# Patient Record
Sex: MALE | Race: BLACK OR AFRICAN AMERICAN | NOT HISPANIC OR LATINO | Employment: OTHER | RURAL
[De-identification: names, ages, dates, MRNs, and addresses within clinical notes are randomized per-mention and may not be internally consistent; named-entity substitution may affect disease eponyms.]

---

## 2017-08-16 ENCOUNTER — HISTORICAL (OUTPATIENT)
Dept: ADMINISTRATIVE | Facility: HOSPITAL | Age: 67
End: 2017-08-16

## 2017-08-17 LAB
LAB AP CLINICAL INFORMATION: NORMAL
LAB AP COMMENTS: NORMAL
LAB AP DIAGNOSIS - HISTORICAL: NORMAL
LAB AP GENERAL CAT - HISTORICAL: NEGATIVE
LAB AP PREPARATIONS - HISTORICAL: NORMAL
LAB AP SPECIMEN SUBMITTED - HISTORICAL: NORMAL

## 2019-10-15 ENCOUNTER — HISTORICAL (OUTPATIENT)
Dept: ADMINISTRATIVE | Facility: HOSPITAL | Age: 69
End: 2019-10-15

## 2019-10-17 LAB
LAB AP CLINICAL INFORMATION: NORMAL
LAB AP DIAGNOSIS - HISTORICAL: NORMAL
LAB AP GROSS PATHOLOGY - HISTORICAL: NORMAL
LAB AP SPECIMEN SUBMITTED - HISTORICAL: NORMAL

## 2020-09-06 ENCOUNTER — HISTORICAL (OUTPATIENT)
Dept: ADMINISTRATIVE | Facility: HOSPITAL | Age: 70
End: 2020-09-06

## 2020-09-06 LAB
ABO: NORMAL
ALBUMIN SERPL BCP-MCNC: 3.9 G/DL (ref 3.5–5)
ALBUMIN/GLOB SERPL: 1 {RATIO}
ALP SERPL-CCNC: 90 U/L (ref 45–115)
ALT SERPL W P-5'-P-CCNC: 46 U/L (ref 16–61)
ANION GAP SERPL CALCULATED.3IONS-SCNC: 15 MMOL/L
ANTIBODY SCREEN: NORMAL
APTT PPP: 27.8 SECONDS (ref 25.2–37.3)
AST SERPL W P-5'-P-CCNC: 62 U/L (ref 15–37)
BASOPHILS # BLD AUTO: 0.04 X10E3/UL (ref 0–0.2)
BASOPHILS NFR BLD AUTO: 0.3 % (ref 0–1)
BILIRUB SERPL-MCNC: 0.7 MG/DL (ref 0–1.2)
BUN SERPL-MCNC: 12 MG/DL (ref 7–18)
BUN/CREAT SERPL: 17.9
CALCIUM SERPL-MCNC: 8.9 MG/DL (ref 8.5–10.1)
CHLORIDE SERPL-SCNC: 101 MMOL/L (ref 98–107)
CO2 SERPL-SCNC: 25 MMOL/L (ref 21–32)
CREAT SERPL-MCNC: 0.67 MG/DL (ref 0.7–1.3)
EOSINOPHIL # BLD AUTO: 0.1 X10E3/UL (ref 0–0.5)
EOSINOPHIL NFR BLD AUTO: 0.9 % (ref 1–4)
ERYTHROCYTE [DISTWIDTH] IN BLOOD BY AUTOMATED COUNT: 16.7 % (ref 11.5–14.5)
GLOBULIN SER-MCNC: 4.1 G/DL (ref 2–4)
GLUCOSE SERPL-MCNC: 84 MG/DL (ref 74–106)
HCT VFR BLD AUTO: 51.1 % (ref 40–54)
HGB BLD-MCNC: 16.7 G/DL (ref 13.5–18)
IMM GRANULOCYTES # BLD AUTO: 0.06 X10E3/UL (ref 0–0.04)
IMM GRANULOCYTES NFR BLD: 0.5 % (ref 0–0.4)
INR BLD: 1 (ref 0–3.3)
LYMPHOCYTES # BLD AUTO: 3.77 X10E3/UL (ref 1–4.8)
LYMPHOCYTES NFR BLD AUTO: 33 % (ref 27–41)
MCH RBC QN AUTO: 30.2 PG (ref 27–31)
MCHC RBC AUTO-ENTMCNC: 32.7 G/DL (ref 32–36)
MCV RBC AUTO: 92.4 FL (ref 80–96)
MONOCYTES # BLD AUTO: 0.9 X10E3/UL (ref 0–0.8)
MONOCYTES NFR BLD AUTO: 7.9 % (ref 2–6)
MPC BLD CALC-MCNC: 9.7 FL (ref 9.4–12.4)
NEUTROPHILS # BLD AUTO: 6.57 X10E3/UL (ref 1.8–7.7)
NEUTROPHILS NFR BLD AUTO: 57.4 % (ref 53–65)
NRBC # BLD AUTO: 0 X10E3/UL (ref 0–0)
NRBC, AUTO (.00): 0 /100 (ref 0–0)
PLATELET # BLD AUTO: 241 X10E3/UL (ref 150–400)
POTASSIUM SERPL-SCNC: 3.8 MMOL/L (ref 3.5–5.1)
PROT SERPL-MCNC: 8 G/DL (ref 6.4–8.2)
PROTHROMBIN TIME: 12.7 SECONDS (ref 11.7–14.7)
RBC # BLD AUTO: 5.53 X10E6/UL (ref 4.6–6.2)
RH TYPE: NORMAL
SARS-COV-2 RNA AMPLIFICATION, QUAL: NEGATIVE
SODIUM SERPL-SCNC: 137 MMOL/L (ref 136–145)
WBC # BLD AUTO: 11.44 X10E3/UL (ref 4.5–11)

## 2020-09-07 ENCOUNTER — HISTORICAL (OUTPATIENT)
Dept: ADMINISTRATIVE | Facility: HOSPITAL | Age: 70
End: 2020-09-07

## 2020-09-07 LAB
APTT PPP: 74.4 SECONDS (ref 25.2–37.3)
APTT PPP: 80.7 SECONDS (ref 25.2–37.3)
APTT PPP: 81.9 SECONDS (ref 25.2–37.3)
APTT PPP: 88 SECONDS (ref 25.2–37.3)
INR BLD: 1.04 (ref 0–3.3)
INR BLD: 1.04 (ref 0–3.3)
INR BLD: 1.06 (ref 0–3.3)
PROTHROMBIN TIME: 13.1 SECONDS (ref 11.7–14.7)
PROTHROMBIN TIME: 13.1 SECONDS (ref 11.7–14.7)
PROTHROMBIN TIME: 13.3 SECONDS (ref 11.7–14.7)

## 2020-09-08 ENCOUNTER — HISTORICAL (OUTPATIENT)
Dept: ADMINISTRATIVE | Facility: HOSPITAL | Age: 70
End: 2020-09-08

## 2020-09-08 LAB
APTT PPP: 139.1 SECONDS (ref 25.2–37.3)
APTT PPP: 67.9 SECONDS (ref 25.2–37.3)
HCT VFR BLD AUTO: 46 % (ref 40–54)
HGB BLD-MCNC: 15.4 G/DL (ref 13.5–18)
INR BLD: 1.02 (ref 0–3.3)
INR BLD: 1.06 (ref 0–3.3)
PLATELET # BLD AUTO: 186 X10E3/UL (ref 150–400)
PROTHROMBIN TIME: 12.9 SECONDS (ref 11.7–14.7)
PROTHROMBIN TIME: 13.3 SECONDS (ref 11.7–14.7)
TROPONIN I SERPL-MCNC: <0.017 NG/ML (ref 0–0.06)
TROPONIN I SERPL-MCNC: <0.017 NG/ML (ref 0–0.06)

## 2020-09-09 ENCOUNTER — HISTORICAL (OUTPATIENT)
Dept: ADMINISTRATIVE | Facility: HOSPITAL | Age: 70
End: 2020-09-09

## 2020-09-09 LAB
APTT PPP: 53.5 SECONDS (ref 25.2–37.3)
APTT PPP: 89.6 SECONDS (ref 25.2–37.3)
INR BLD: 0.98 (ref 0–3.3)
INR BLD: 1.02 (ref 0–3.3)
PROTHROMBIN TIME: 12.5 SECONDS (ref 11.7–14.7)
PROTHROMBIN TIME: 12.9 SECONDS (ref 11.7–14.7)
SARS-COV-2 RNA AMPLIFICATION, QUAL: NEGATIVE
TROPONIN I SERPL-MCNC: <0.017 NG/ML (ref 0–0.06)

## 2020-09-10 ENCOUNTER — HISTORICAL (OUTPATIENT)
Dept: ADMINISTRATIVE | Facility: HOSPITAL | Age: 70
End: 2020-09-10

## 2020-09-10 LAB
ABO: NORMAL
ANTIBODY SCREEN: NORMAL
APTT PPP: 61.1 SECONDS (ref 25.2–37.3)
HCT VFR BLD AUTO: 43.1 % (ref 40–54)
HGB BLD-MCNC: 14.4 G/DL (ref 13.5–18)
INR BLD: 1 (ref 0–3.3)
PLATELET # BLD AUTO: 202 X10E3/UL (ref 150–400)
PROTHROMBIN TIME: 12.7 SECONDS (ref 11.7–14.7)
RH TYPE: NORMAL

## 2020-09-11 ENCOUNTER — HISTORICAL (OUTPATIENT)
Dept: ADMINISTRATIVE | Facility: HOSPITAL | Age: 70
End: 2020-09-11

## 2020-09-11 LAB
ANION GAP SERPL CALCULATED.3IONS-SCNC: 11 MMOL/L
BUN SERPL-MCNC: 5 MG/DL (ref 7–18)
CALCIUM SERPL-MCNC: 8.5 MG/DL (ref 8.5–10.1)
CHLORIDE SERPL-SCNC: 102 MMOL/L (ref 98–107)
CO2 SERPL-SCNC: 27 MMOL/L (ref 21–32)
CREAT SERPL-MCNC: 0.74 MG/DL (ref 0.7–1.3)
GLUCOSE SERPL-MCNC: 79 MG/DL (ref 74–106)
HCT VFR BLD AUTO: 44.9 % (ref 40–54)
POTASSIUM SERPL-SCNC: 3.6 MMOL/L (ref 3.5–5.1)
SODIUM SERPL-SCNC: 136 MMOL/L (ref 136–145)

## 2020-12-28 ENCOUNTER — HISTORICAL (OUTPATIENT)
Dept: ADMINISTRATIVE | Facility: HOSPITAL | Age: 70
End: 2020-12-28

## 2020-12-28 LAB
ABO: NORMAL
ALBUMIN SERPL BCP-MCNC: 3.7 G/DL (ref 3.5–5)
ALBUMIN/GLOB SERPL: 0.9 {RATIO}
ALP SERPL-CCNC: 87 U/L (ref 45–115)
ALT SERPL W P-5'-P-CCNC: 64 U/L (ref 16–61)
ANION GAP SERPL CALCULATED.3IONS-SCNC: 14 MMOL/L
ANISOCYTOSIS BLD QL SMEAR: ABNORMAL
ANTIBODY SCREEN: NORMAL
APTT PPP: 20.6 SECONDS (ref 25.2–37.3)
AST SERPL W P-5'-P-CCNC: 168 U/L (ref 15–37)
BASOPHILS # BLD AUTO: 0.05 X10E3/UL (ref 0–0.2)
BASOPHILS NFR BLD AUTO: 0.5 % (ref 0–1)
BILIRUB SERPL-MCNC: 0.8 MG/DL (ref 0–1.2)
BUN SERPL-MCNC: 13 MG/DL (ref 7–18)
BUN/CREAT SERPL: 16
CALCIUM SERPL-MCNC: 9.2 MG/DL (ref 8.5–10.1)
CHLORIDE SERPL-SCNC: 102 MMOL/L (ref 98–107)
CO2 SERPL-SCNC: 29 MMOL/L (ref 21–32)
CREAT SERPL-MCNC: 0.81 MG/DL (ref 0.7–1.3)
EOSINOPHIL # BLD AUTO: 0.06 X10E3/UL (ref 0–0.5)
EOSINOPHIL NFR BLD AUTO: 0.6 % (ref 1–4)
ERYTHROCYTE [DISTWIDTH] IN BLOOD BY AUTOMATED COUNT: 14.8 % (ref 11.5–14.5)
GLOBULIN SER-MCNC: 4.1 G/DL (ref 2–4)
GLUCOSE SERPL-MCNC: 103 MG/DL (ref 74–106)
HCT VFR BLD AUTO: 43.3 % (ref 40–54)
HGB BLD-MCNC: 15.1 G/DL (ref 13.5–18)
IMM GRANULOCYTES # BLD AUTO: 0.04 X10E3/UL (ref 0–0.04)
IMM GRANULOCYTES NFR BLD: 0.4 % (ref 0–0.4)
INR BLD: 0.96 (ref 0–3.3)
LACTATE SERPL-SCNC: 1.7 MMOL/L (ref 0.4–2)
LYMPHOCYTES # BLD AUTO: 2.96 X10E3/UL (ref 1–4.8)
LYMPHOCYTES NFR BLD AUTO: 31.7 % (ref 27–41)
MCH RBC QN AUTO: 32.6 PG (ref 27–31)
MCHC RBC AUTO-ENTMCNC: 34.9 G/DL (ref 32–36)
MCV RBC AUTO: 93.5 FL (ref 80–96)
MONOCYTES # BLD AUTO: 0.97 X10E3/UL (ref 0–0.8)
MONOCYTES NFR BLD AUTO: 10.4 % (ref 2–6)
MPC BLD CALC-MCNC: 9.8 FL (ref 9.4–12.4)
NEUTROPHILS # BLD AUTO: 5.27 X10E3/UL (ref 1.8–7.7)
NEUTROPHILS NFR BLD AUTO: 56.4 % (ref 53–65)
NRBC # BLD AUTO: 0 X10E3/UL (ref 0–0)
NRBC, AUTO (.00): 0.2 /100 (ref 0–0)
PLATELET # BLD AUTO: 227 X10E3/UL (ref 150–400)
PLATELET MORPHOLOGY: ABNORMAL
POLYCHROMASIA BLD QL SMEAR: ABNORMAL
POTASSIUM SERPL-SCNC: 3.7 MMOL/L (ref 3.5–5.1)
PROT SERPL-MCNC: 7.8 G/DL (ref 6.4–8.2)
PROTHROMBIN TIME: 12.3 SECONDS (ref 11.7–14.7)
RBC # BLD AUTO: 4.63 X10E6/UL (ref 4.6–6.2)
RH TYPE: NORMAL
SARS-COV-2 RNA AMPLIFICATION, QUAL: NEGATIVE
SODIUM SERPL-SCNC: 141 MMOL/L (ref 136–145)
TARGETS BLD QL SMEAR: ABNORMAL
WBC # BLD AUTO: 9.35 X10E3/UL (ref 4.5–11)

## 2020-12-29 ENCOUNTER — HISTORICAL (OUTPATIENT)
Dept: ADMINISTRATIVE | Facility: HOSPITAL | Age: 70
End: 2020-12-29

## 2020-12-29 LAB
ANION GAP SERPL CALCULATED.3IONS-SCNC: 14 MMOL/L
BASOPHILS # BLD AUTO: 0.04 X10E3/UL (ref 0–0.2)
BASOPHILS NFR BLD AUTO: 0.2 % (ref 0–1)
BUN SERPL-MCNC: 11 MG/DL (ref 7–18)
CALCIUM SERPL-MCNC: 8 MG/DL (ref 8.5–10.1)
CHLORIDE SERPL-SCNC: 102 MMOL/L (ref 98–107)
CO2 SERPL-SCNC: 25 MMOL/L (ref 21–32)
CREAT SERPL-MCNC: 0.74 MG/DL (ref 0.7–1.3)
EOSINOPHIL # BLD AUTO: 0 X10E3/UL (ref 0–0.5)
EOSINOPHIL NFR BLD AUTO: 0 % (ref 1–4)
ERYTHROCYTE [DISTWIDTH] IN BLOOD BY AUTOMATED COUNT: 15 % (ref 11.5–14.5)
GLUCOSE SERPL-MCNC: 136 MG/DL (ref 70–105)
GLUCOSE SERPL-MCNC: 157 MG/DL (ref 70–105)
GLUCOSE SERPL-MCNC: 202 MG/DL (ref 74–106)
HCT VFR BLD AUTO: 32.2 % (ref 40–54)
HGB BLD-MCNC: 11.1 G/DL (ref 13.5–18)
IMM GRANULOCYTES # BLD AUTO: 0.07 X10E3/UL (ref 0–0.04)
IMM GRANULOCYTES NFR BLD: 0.4 % (ref 0–0.4)
LYMPHOCYTES # BLD AUTO: 1.27 X10E3/UL (ref 1–4.8)
LYMPHOCYTES NFR BLD AUTO: 6.9 % (ref 27–41)
MAGNESIUM SERPL-MCNC: 1.4 MG/DL (ref 1.7–2.3)
MCH RBC QN AUTO: 32.6 PG (ref 27–31)
MCHC RBC AUTO-ENTMCNC: 34.5 G/DL (ref 32–36)
MCV RBC AUTO: 94.4 FL (ref 80–96)
MONOCYTES # BLD AUTO: 1.55 X10E3/UL (ref 0–0.8)
MONOCYTES NFR BLD AUTO: 8.5 % (ref 2–6)
MPC BLD CALC-MCNC: 10.3 FL (ref 9.4–12.4)
NEUTROPHILS # BLD AUTO: 15.39 X10E3/UL (ref 1.8–7.7)
NEUTROPHILS NFR BLD AUTO: 84 % (ref 53–65)
NRBC # BLD AUTO: 0 X10E3/UL (ref 0–0)
NRBC, AUTO (.00): 0.1 /100 (ref 0–0)
PLATELET # BLD AUTO: 200 X10E3/UL (ref 150–400)
POTASSIUM SERPL-SCNC: 3.3 MMOL/L (ref 3.5–5.1)
RBC # BLD AUTO: 3.41 X10E6/UL (ref 4.6–6.2)
SODIUM SERPL-SCNC: 138 MMOL/L (ref 136–145)
WBC # BLD AUTO: 18.32 X10E3/UL (ref 4.5–11)

## 2020-12-30 ENCOUNTER — HISTORICAL (OUTPATIENT)
Dept: ADMINISTRATIVE | Facility: HOSPITAL | Age: 70
End: 2020-12-30

## 2020-12-30 LAB
ANION GAP SERPL CALCULATED.3IONS-SCNC: 9 MMOL/L
BUN SERPL-MCNC: 6 MG/DL (ref 7–18)
CALCIUM SERPL-MCNC: 8 MG/DL (ref 8.5–10.1)
CHLORIDE SERPL-SCNC: 103 MMOL/L (ref 98–107)
CO2 SERPL-SCNC: 28 MMOL/L (ref 21–32)
CREAT SERPL-MCNC: 0.92 MG/DL (ref 0.7–1.3)
GLUCOSE SERPL-MCNC: 112 MG/DL (ref 74–106)
POTASSIUM SERPL-SCNC: 3.9 MMOL/L (ref 3.5–5.1)
SODIUM SERPL-SCNC: 136 MMOL/L (ref 136–145)

## 2020-12-31 ENCOUNTER — HISTORICAL (OUTPATIENT)
Dept: ADMINISTRATIVE | Facility: HOSPITAL | Age: 70
End: 2020-12-31

## 2020-12-31 LAB
ABO: NORMAL
ANION GAP SERPL CALCULATED.3IONS-SCNC: 13 MMOL/L
ANTIBODY SCREEN: NORMAL
BASOPHILS # BLD AUTO: 0.02 X10E3/UL (ref 0–0.2)
BASOPHILS NFR BLD AUTO: 0.2 % (ref 0–1)
BUN SERPL-MCNC: 4 MG/DL (ref 7–18)
CALCIUM SERPL-MCNC: 8.8 MG/DL (ref 8.5–10.1)
CHLORIDE SERPL-SCNC: 104 MMOL/L (ref 98–107)
CO2 SERPL-SCNC: 25 MMOL/L (ref 21–32)
CREAT SERPL-MCNC: 0.67 MG/DL (ref 0.7–1.3)
EOSINOPHIL # BLD AUTO: 0.01 X10E3/UL (ref 0–0.5)
EOSINOPHIL NFR BLD AUTO: 0.1 % (ref 1–4)
ERYTHROCYTE [DISTWIDTH] IN BLOOD BY AUTOMATED COUNT: 14.2 % (ref 11.5–14.5)
GLUCOSE SERPL-MCNC: 118 MG/DL (ref 74–106)
HCO3 UR-SCNC: 25 MMOL/L (ref 21–28)
HCT VFR BLD AUTO: 26.5 % (ref 40–54)
HGB BLD-MCNC: 9 G/DL (ref 13.5–18)
IMM GRANULOCYTES # BLD AUTO: 0.08 X10E3/UL (ref 0–0.04)
IMM GRANULOCYTES NFR BLD: 0.6 % (ref 0–0.4)
LYMPHOCYTES # BLD AUTO: 1.94 X10E3/UL (ref 1–4.8)
LYMPHOCYTES NFR BLD AUTO: 15.7 % (ref 27–41)
MCH RBC QN AUTO: 32.1 PG (ref 27–31)
MCHC RBC AUTO-ENTMCNC: 34 G/DL (ref 32–36)
MCV RBC AUTO: 94.6 FL (ref 80–96)
MONOCYTES # BLD AUTO: 1.28 X10E3/UL (ref 0–0.8)
MONOCYTES NFR BLD AUTO: 10.4 % (ref 2–6)
MPC BLD CALC-MCNC: 10.5 FL (ref 9.4–12.4)
NEUTROPHILS # BLD AUTO: 9.03 X10E3/UL (ref 1.8–7.7)
NEUTROPHILS NFR BLD AUTO: 73 % (ref 53–65)
NRBC # BLD AUTO: 0 X10E3/UL (ref 0–0)
NRBC, AUTO (.00): 0 /100 (ref 0–0)
PCO2 BLDA: 29 MM HG (ref 35–48)
PH SMN: 7.54 PH UNITS (ref 7.35–7.45)
PLATELET # BLD AUTO: 173 X10E3/UL (ref 150–400)
PO2 BLDA: 101 MM HG (ref 83–108)
POC A-ADO2: 12
POC BASE EXCESS ARTERIAL: 2.9 MMOL/L (ref -2–3)
POC SATURATED O2: 98 % (ref 95–98)
POTASSIUM SERPL-SCNC: 3.8 MMOL/L (ref 3.5–5.1)
RBC # BLD AUTO: 2.8 X10E6/UL (ref 4.6–6.2)
RH TYPE: NORMAL
SODIUM SERPL-SCNC: 138 MMOL/L (ref 136–145)
WBC # BLD AUTO: 12.36 X10E3/UL (ref 4.5–11)

## 2021-01-04 ENCOUNTER — HISTORICAL (OUTPATIENT)
Dept: ADMINISTRATIVE | Facility: HOSPITAL | Age: 71
End: 2021-01-04

## 2021-01-04 LAB
ABO: NORMAL
ANION GAP SERPL CALCULATED.3IONS-SCNC: 11 MMOL/L
ANTIBODY SCREEN: NORMAL
BASOPHILS # BLD AUTO: 0.04 X10E3/UL (ref 0–0.2)
BASOPHILS NFR BLD AUTO: 0.3 % (ref 0–1)
BUN SERPL-MCNC: 5 MG/DL (ref 7–18)
CALCIUM SERPL-MCNC: 8.9 MG/DL (ref 8.5–10.1)
CHLORIDE SERPL-SCNC: 103 MMOL/L (ref 98–107)
CO2 SERPL-SCNC: 23 MMOL/L (ref 21–32)
CREAT SERPL-MCNC: 0.81 MG/DL (ref 0.7–1.3)
EOSINOPHIL # BLD AUTO: 0.06 X10E3/UL (ref 0–0.5)
EOSINOPHIL NFR BLD AUTO: 0.4 % (ref 1–4)
ERYTHROCYTE [DISTWIDTH] IN BLOOD BY AUTOMATED COUNT: 14.4 % (ref 11.5–14.5)
GLUCOSE SERPL-MCNC: 101 MG/DL (ref 74–106)
HCT VFR BLD AUTO: 26.3 % (ref 40–54)
HGB BLD-MCNC: 8.9 G/DL (ref 13.5–18)
IMM GRANULOCYTES # BLD AUTO: 0.15 X10E3/UL (ref 0–0.04)
IMM GRANULOCYTES NFR BLD: 1 % (ref 0–0.4)
LYMPHOCYTES # BLD AUTO: 2.35 X10E3/UL (ref 1–4.8)
LYMPHOCYTES NFR BLD AUTO: 16.3 % (ref 27–41)
MCH RBC QN AUTO: 32.4 PG (ref 27–31)
MCHC RBC AUTO-ENTMCNC: 33.8 G/DL (ref 32–36)
MCV RBC AUTO: 95.6 FL (ref 80–96)
MONOCYTES # BLD AUTO: 1.83 X10E3/UL (ref 0–0.8)
MONOCYTES NFR BLD AUTO: 12.7 % (ref 2–6)
MPC BLD CALC-MCNC: 9.7 FL (ref 9.4–12.4)
NEUTROPHILS # BLD AUTO: 9.96 X10E3/UL (ref 1.8–7.7)
NEUTROPHILS NFR BLD AUTO: 69.3 % (ref 53–65)
NRBC # BLD AUTO: 0 X10E3/UL (ref 0–0)
NRBC, AUTO (.00): 0 /100 (ref 0–0)
PLATELET # BLD AUTO: 324 X10E3/UL (ref 150–400)
POTASSIUM SERPL-SCNC: 4 MMOL/L (ref 3.5–5.1)
RBC # BLD AUTO: 2.75 X10E6/UL (ref 4.6–6.2)
RH TYPE: NORMAL
SODIUM SERPL-SCNC: 133 MMOL/L (ref 136–145)
WBC # BLD AUTO: 14.39 X10E3/UL (ref 4.5–11)

## 2021-01-05 ENCOUNTER — HISTORICAL (OUTPATIENT)
Dept: ADMINISTRATIVE | Facility: HOSPITAL | Age: 71
End: 2021-01-05

## 2021-01-05 LAB
HCT VFR BLD AUTO: 22.2 % (ref 40–54)
HCT VFR BLD AUTO: 24.6 % (ref 40–54)

## 2021-01-06 LAB

## 2021-03-22 RX ORDER — METOPROLOL TARTRATE 25 MG/1
25 TABLET, FILM COATED ORAL 2 TIMES DAILY
COMMUNITY

## 2021-03-22 RX ORDER — ASPIRIN 325 MG
325 TABLET ORAL DAILY
COMMUNITY

## 2021-03-24 ENCOUNTER — HOSPITAL ENCOUNTER (OUTPATIENT)
Dept: RADIOLOGY | Facility: HOSPITAL | Age: 71
Discharge: HOME | End: 2021-03-24
Attending: SURGERY
Payer: MEDICARE

## 2021-03-24 ENCOUNTER — OFFICE VISIT (OUTPATIENT)
Dept: SURGERY | Facility: CLINIC | Age: 71
End: 2021-03-24
Payer: MEDICARE

## 2021-03-24 VITALS
TEMPERATURE: 98 F | HEIGHT: 68 IN | BODY MASS INDEX: 21.22 KG/M2 | OXYGEN SATURATION: 97 % | HEART RATE: 67 BPM | WEIGHT: 140 LBS

## 2021-03-24 DIAGNOSIS — I73.9 PVD (PERIPHERAL VASCULAR DISEASE): Primary | ICD-10-CM

## 2021-03-24 DIAGNOSIS — I73.9 PERIPHERAL VASCULAR DISEASE, UNSPECIFIED: ICD-10-CM

## 2021-03-24 PROCEDURE — 99212 PR OFFICE/OUTPT VISIT, EST, LEVL II, 10-19 MIN: ICD-10-PCS | Mod: S$PBB,,, | Performed by: SURGERY

## 2021-03-24 PROCEDURE — 93923 US ARTERIAL DOPPLER EXAM: ICD-10-PCS | Mod: 26,,, | Performed by: SURGERY

## 2021-03-24 PROCEDURE — 99213 OFFICE O/P EST LOW 20 MIN: CPT | Mod: PBBFAC,25 | Performed by: SURGERY

## 2021-03-24 PROCEDURE — 93923 UPR/LXTR ART STDY 3+ LVLS: CPT | Mod: 26,,, | Performed by: SURGERY

## 2021-03-24 PROCEDURE — 99999 PR PBB SHADOW E&M-EST. PATIENT-LVL III: ICD-10-PCS | Mod: PBBFAC,,, | Performed by: SURGERY

## 2021-03-24 PROCEDURE — 99212 OFFICE O/P EST SF 10 MIN: CPT | Mod: S$PBB,,, | Performed by: SURGERY

## 2021-03-24 PROCEDURE — 93923 UPR/LXTR ART STDY 3+ LVLS: CPT | Mod: TC

## 2021-03-24 PROCEDURE — 99999 PR PBB SHADOW E&M-EST. PATIENT-LVL III: CPT | Mod: PBBFAC,,, | Performed by: SURGERY

## 2021-05-05 DIAGNOSIS — Z89.611 S/P AKA (ABOVE KNEE AMPUTATION), RIGHT: Primary | ICD-10-CM

## 2021-05-18 ENCOUNTER — CLINICAL SUPPORT (OUTPATIENT)
Dept: REHABILITATION | Facility: HOSPITAL | Age: 71
End: 2021-05-18
Payer: MEDICARE

## 2021-05-18 ENCOUNTER — DOCUMENTATION ONLY (OUTPATIENT)
Dept: REHABILITATION | Facility: HOSPITAL | Age: 71
End: 2021-05-18

## 2021-05-18 DIAGNOSIS — Z89.611 S/P AKA (ABOVE KNEE AMPUTATION), RIGHT: ICD-10-CM

## 2021-05-18 PROCEDURE — 97116 GAIT TRAINING THERAPY: CPT | Mod: PN

## 2021-05-18 PROCEDURE — 97110 THERAPEUTIC EXERCISES: CPT | Mod: PN

## 2021-05-20 ENCOUNTER — CLINICAL SUPPORT (OUTPATIENT)
Dept: REHABILITATION | Facility: HOSPITAL | Age: 71
End: 2021-05-20
Payer: MEDICARE

## 2021-05-20 ENCOUNTER — DOCUMENTATION ONLY (OUTPATIENT)
Dept: REHABILITATION | Facility: HOSPITAL | Age: 71
End: 2021-05-20

## 2021-05-20 DIAGNOSIS — S78.111A ABOVE-KNEE AMPUTATION OF RIGHT LOWER EXTREMITY: Primary | ICD-10-CM

## 2021-05-20 PROCEDURE — 97110 THERAPEUTIC EXERCISES: CPT | Mod: PN

## 2021-05-20 PROCEDURE — 97116 GAIT TRAINING THERAPY: CPT | Mod: PN

## 2021-05-20 PROCEDURE — 97112 NEUROMUSCULAR REEDUCATION: CPT | Mod: PN

## 2021-05-28 ENCOUNTER — CLINICAL SUPPORT (OUTPATIENT)
Dept: REHABILITATION | Facility: HOSPITAL | Age: 71
End: 2021-05-28
Payer: MEDICARE

## 2021-05-28 DIAGNOSIS — R26.89 FUNCTIONAL GAIT ABNORMALITY: Primary | ICD-10-CM

## 2021-05-28 DIAGNOSIS — Z74.09 DECREASED FUNCTIONAL MOBILITY AND ENDURANCE: ICD-10-CM

## 2021-05-28 PROCEDURE — 97116 GAIT TRAINING THERAPY: CPT | Mod: PN,CQ

## 2021-05-28 PROCEDURE — 97110 THERAPEUTIC EXERCISES: CPT | Mod: PN,CQ

## 2021-06-25 ENCOUNTER — CLINICAL SUPPORT (OUTPATIENT)
Dept: REHABILITATION | Facility: HOSPITAL | Age: 71
End: 2021-06-25
Payer: MEDICARE

## 2021-06-25 DIAGNOSIS — Z89.511 AMPUTEE, BELOW KNEE, RIGHT: Primary | ICD-10-CM

## 2021-06-25 PROCEDURE — 97110 THERAPEUTIC EXERCISES: CPT | Mod: PN,CQ

## 2021-07-01 ENCOUNTER — CLINICAL SUPPORT (OUTPATIENT)
Dept: REHABILITATION | Facility: HOSPITAL | Age: 71
End: 2021-07-01
Payer: MEDICARE

## 2021-07-01 DIAGNOSIS — Z89.611 AMPUTEE, ABOVE KNEE, RIGHT: Primary | ICD-10-CM

## 2021-07-01 PROCEDURE — 97110 THERAPEUTIC EXERCISES: CPT | Mod: PN,CQ

## 2021-07-09 ENCOUNTER — CLINICAL SUPPORT (OUTPATIENT)
Dept: REHABILITATION | Facility: HOSPITAL | Age: 71
End: 2021-07-09
Payer: MEDICARE

## 2021-07-09 DIAGNOSIS — Z89.611 AMPUTEE, ABOVE KNEE, RIGHT: Primary | ICD-10-CM

## 2021-07-09 PROCEDURE — 97110 THERAPEUTIC EXERCISES: CPT | Mod: PN,CQ

## 2021-07-19 ENCOUNTER — CLINICAL SUPPORT (OUTPATIENT)
Dept: REHABILITATION | Facility: HOSPITAL | Age: 71
End: 2021-07-19
Payer: MEDICARE

## 2021-07-19 DIAGNOSIS — Z89.611 AMPUTEE, ABOVE KNEE, RIGHT: Primary | ICD-10-CM

## 2021-07-19 PROCEDURE — 97110 THERAPEUTIC EXERCISES: CPT | Mod: PN,CQ

## 2021-07-19 PROCEDURE — 97116 GAIT TRAINING THERAPY: CPT | Mod: PN,CQ

## 2021-07-23 ENCOUNTER — CLINICAL SUPPORT (OUTPATIENT)
Dept: REHABILITATION | Facility: HOSPITAL | Age: 71
End: 2021-07-23
Payer: MEDICARE

## 2021-07-23 DIAGNOSIS — Z74.09 DECREASED FUNCTIONAL MOBILITY AND ENDURANCE: Primary | ICD-10-CM

## 2021-07-23 PROCEDURE — 97110 THERAPEUTIC EXERCISES: CPT | Mod: PN,CQ

## 2021-07-23 PROCEDURE — 97116 GAIT TRAINING THERAPY: CPT | Mod: PN,CQ

## 2021-07-28 ENCOUNTER — CLINICAL SUPPORT (OUTPATIENT)
Dept: REHABILITATION | Facility: HOSPITAL | Age: 71
End: 2021-07-28
Payer: MEDICARE

## 2021-07-28 DIAGNOSIS — R26.89 FUNCTIONAL GAIT ABNORMALITY: ICD-10-CM

## 2021-07-28 DIAGNOSIS — Z74.09 DECREASED FUNCTIONAL MOBILITY AND ENDURANCE: ICD-10-CM

## 2021-07-28 PROCEDURE — 97110 THERAPEUTIC EXERCISES: CPT | Mod: PN

## 2021-08-16 ENCOUNTER — CLINICAL SUPPORT (OUTPATIENT)
Dept: REHABILITATION | Facility: HOSPITAL | Age: 71
End: 2021-08-16
Payer: MEDICARE

## 2021-08-16 DIAGNOSIS — R26.89 FUNCTIONAL GAIT ABNORMALITY: Primary | ICD-10-CM

## 2021-08-16 PROCEDURE — 97110 THERAPEUTIC EXERCISES: CPT | Mod: KX,PN,CQ

## 2021-08-16 PROCEDURE — 97116 GAIT TRAINING THERAPY: CPT | Mod: KX,PN,CQ

## 2021-08-18 ENCOUNTER — CLINICAL SUPPORT (OUTPATIENT)
Dept: REHABILITATION | Facility: HOSPITAL | Age: 71
End: 2021-08-18
Payer: MEDICARE

## 2021-08-18 DIAGNOSIS — Z89.611 AMPUTEE, ABOVE KNEE, RIGHT: Primary | ICD-10-CM

## 2021-08-18 PROCEDURE — 97110 THERAPEUTIC EXERCISES: CPT | Mod: PN,CQ

## 2021-08-24 ENCOUNTER — CLINICAL SUPPORT (OUTPATIENT)
Dept: REHABILITATION | Facility: HOSPITAL | Age: 71
End: 2021-08-24
Payer: MEDICARE

## 2021-08-24 DIAGNOSIS — R26.89 FUNCTIONAL GAIT ABNORMALITY: Primary | ICD-10-CM

## 2021-08-24 PROCEDURE — 97110 THERAPEUTIC EXERCISES: CPT | Mod: PN,CQ

## 2021-08-24 PROCEDURE — 97116 GAIT TRAINING THERAPY: CPT | Mod: PN,CQ

## 2021-10-21 ENCOUNTER — OFFICE VISIT (OUTPATIENT)
Dept: SURGERY | Facility: CLINIC | Age: 71
End: 2021-10-21
Payer: MEDICARE

## 2021-10-21 DIAGNOSIS — I73.9 PVD (PERIPHERAL VASCULAR DISEASE): Primary | ICD-10-CM

## 2021-10-21 PROCEDURE — 99212 PR OFFICE/OUTPT VISIT, EST, LEVL II, 10-19 MIN: ICD-10-PCS | Mod: S$PBB,,, | Performed by: SURGERY

## 2021-10-21 PROCEDURE — 99213 OFFICE O/P EST LOW 20 MIN: CPT | Mod: PBBFAC | Performed by: SURGERY

## 2021-10-21 PROCEDURE — 99212 OFFICE O/P EST SF 10 MIN: CPT | Mod: S$PBB,,, | Performed by: SURGERY

## 2021-10-27 DIAGNOSIS — S88.911A: Primary | ICD-10-CM

## 2021-11-03 ENCOUNTER — CLINICAL SUPPORT (OUTPATIENT)
Dept: REHABILITATION | Facility: HOSPITAL | Age: 71
End: 2021-11-03
Payer: MEDICARE

## 2021-11-03 DIAGNOSIS — S88.911A: ICD-10-CM

## 2021-11-03 DIAGNOSIS — R26.89 FUNCTIONAL GAIT ABNORMALITY: ICD-10-CM

## 2021-11-03 DIAGNOSIS — S88.911D TRAUMATIC AMPUTATION OF RIGHT LOWER EXTREMITY, SUBSEQUENT ENCOUNTER: ICD-10-CM

## 2021-11-03 DIAGNOSIS — Z74.09 DECREASED FUNCTIONAL MOBILITY AND ENDURANCE: Primary | ICD-10-CM

## 2021-11-03 PROCEDURE — 97110 THERAPEUTIC EXERCISES: CPT | Mod: PN

## 2021-11-03 PROCEDURE — 97161 PT EVAL LOW COMPLEX 20 MIN: CPT | Mod: PN

## 2021-11-22 ENCOUNTER — CLINICAL SUPPORT (OUTPATIENT)
Dept: REHABILITATION | Facility: HOSPITAL | Age: 71
End: 2021-11-22
Payer: MEDICARE

## 2021-11-22 DIAGNOSIS — Z74.09 DECREASED FUNCTIONAL MOBILITY AND ENDURANCE: Primary | ICD-10-CM

## 2021-11-22 PROCEDURE — 97116 GAIT TRAINING THERAPY: CPT | Mod: KX,PN,CQ

## 2021-11-22 PROCEDURE — 97110 THERAPEUTIC EXERCISES: CPT | Mod: KX,PN,CQ

## 2021-12-02 ENCOUNTER — CLINICAL SUPPORT (OUTPATIENT)
Dept: REHABILITATION | Facility: HOSPITAL | Age: 71
End: 2021-12-02
Payer: MEDICARE

## 2021-12-02 DIAGNOSIS — R26.89 FUNCTIONAL GAIT ABNORMALITY: Primary | ICD-10-CM

## 2021-12-02 PROCEDURE — 97116 GAIT TRAINING THERAPY: CPT | Mod: KX,PN,CQ

## 2021-12-02 PROCEDURE — 97110 THERAPEUTIC EXERCISES: CPT | Mod: KX,PN,CQ

## 2022-03-18 DIAGNOSIS — S78.111A ABOVE KNEE AMPUTATION OF RIGHT LOWER EXTREMITY: Primary | ICD-10-CM

## 2022-03-28 ENCOUNTER — CLINICAL SUPPORT (OUTPATIENT)
Dept: REHABILITATION | Facility: HOSPITAL | Age: 72
End: 2022-03-28
Payer: MEDICARE

## 2022-03-28 DIAGNOSIS — Z74.09 DECREASED FUNCTIONAL MOBILITY AND ENDURANCE: ICD-10-CM

## 2022-03-28 DIAGNOSIS — S88.911D TRAUMATIC AMPUTATION OF RIGHT LOWER EXTREMITY, SUBSEQUENT ENCOUNTER: ICD-10-CM

## 2022-03-28 DIAGNOSIS — S78.111A ABOVE KNEE AMPUTATION OF RIGHT LOWER EXTREMITY: ICD-10-CM

## 2022-03-28 PROCEDURE — 97116 GAIT TRAINING THERAPY: CPT | Mod: PN

## 2022-03-28 PROCEDURE — 97161 PT EVAL LOW COMPLEX 20 MIN: CPT | Mod: PN

## 2022-03-28 NOTE — PLAN OF CARE
[facility]   Physical Therapy Initial Evaluation    Date: 3/28/2022   Name: Mao Merritt  Clinic Number: [id number]    Therapy Diagnosis:   Encounter Diagnosis   Name Primary?    Above knee amputation of right lower extremity      Physician: Seun Carrillo MD    Physician Orders: PT Eval and Treat    Medical Diagnosis from Referral: right above knee amputation , decreased mobility      Evaluation Date: 3/28/2022  Updated Plan of Care Due : 4/27/2022  Authorization Period Expiration: [employer]   Plan of Care Expiration: approved 4 visits until 4/27  Visit # / Visits authorized: 1/ 4    Time In: 945  Time Out: 1045   Total Appointment Time (timed & untimed codes): 50 minutes    Precautions: Standard    Subjective   Date of onset: pt states he had right above the knee amputation in December of 2020. Pt states he gets around on walker ok, pt states its just easier to use a wheelchair in the house. Pt states he is not wearing the prosthesis everyday.  Pt states sometimes he goes a few days without wearing it.     History of current condition - Mao reports: hx above.       Medical History:   Past Medical History:   Diagnosis Date    Hypertension     PVD (peripheral vascular disease)        Surgical History:   Mao Merritt  has a past surgical history that includes Amputation (Right, 12/2020).    Medications:   Mao has a current medication list which includes the following prescription(s): aspirin and metoprolol tartrate.    Allergies:   Review of patient's allergies indicates:  No Known Allergies     Imaging, none: none     Prior Therapy: pt has had PT 2x since amputation. , pt has been non compliant with wearing the prosthesis regularly   Social History:   lives with their spouse  Occupation: retired   Prior Level of Function: pt using wheelchair at home, walks some with prosthesis using walker   Current Level of Function: pt using prosthesis with walker with little weight bearing on the right side      Pain:  Current 0/10, worst 7/10, best 0/10   Location: right upper legs generalized   Description: Aching, Dull, Tingling and Electric  Aggravating Factors: hurts random even when out out of prosthesis   Easing Factors: nothing    Patients goals: be able to walk with crutches or independently     Objective             Incision :    Well healed , some sensitivity at scar and patient voices phantom limb pain             Range of Motion/Strength :                  Left Extremity                                                                        Right Extremity   AROM PROM Strength  Location  AROM    PROM   Strength   90 95 5   Hip      Flexion 85 90 4   5  3+               Extension -10  2+   20  4  abduction  10  3                              140  5   Knee    Flexion Na      0  5                Extension Na      10  5   Ankle   Dorsiflexion Na      NT                  Plantar Flexion Na      Nt                   Inversion na     NT                  Eversion na                    Functional Impairments :  decreased knee range of motion and strength      Limitation/Restriction for FOTO lower leg without knee  Survey    Therapist reviewed FOTO scores for Mao Merritt on 3/28/2022.   FOTO documents entered into [employer] - see Media section.    Limitation Score: 8%         TREATMENT                                                                                                                                                                                                                                                                                                                                                                                                                                                                                                                                                                                                                                                                                                                                                                                                                                                                                                                                                                                                                                                                                                                                                                                                                                                                                                                                                                                                                                                                                                                                                                                                                                                                                                                                                                                                                                                                                                                                                                                                                                                                                                                                                                                                                                                                                                                                                                                                                                                                                                                                                                                                                       Pt instructed to weight shift in walker and wear prosthesis 3 hours a day .                                                                                                                                                                                                                                                                                                                                                                                                                                                                                                                                                                                                                                                                                                                                                                                                                                                                                                                                                                                                                                                                                                                                                                                                                                                                                                                                                                                                                                                                                                                                                                                                        Mao received the treatments listed below:  GAIT TRAINING to improve functional mobility and safety for 10 minutes.        Home Exercises and Patient Education Provided    Education provided:   - Pt performed and received home ex program.     Written Home Exercises Provided: yes.  Exercises were reviewed and Mao was able to demonstrate them prior to the end of the session.  Mao demonstrated fair   understanding of the education provided.     See EMR under Patient Instructions for exercises provided 3/28/2022.kumar Cavanaugh is a 71 y.o. male referred to outpatient Physical Therapy with a medical diagnosis of right aka 2 years ago . Patient presents with poor balance with prosthesis on and decreased weight bearing on the prosthesis     Patient prognosis is Fair.   Patientt will benefit from skilled outpatient Physical Therapy to address the deficits stated above and in the chart below, provide patient /family education, and to maximize patientt's level of independence.   Pt is inconsistent wearing prosthesis and uses wheelchair for house hold mobiltiy     Plan of care discussed with patient: Yes  Patient's spiritual, cultural and educational needs considered and patient is agreeable to the plan of care and goals as stated below:     Anticipated Barriers for therapy: decreased weight bearing on right side , decreased time spent in prosthesis   Goals:  Short Term Goals: 4  weeks   Pt will be independent with home ex program (weight shifting and gait at this time)  Pt will be able to stand without upper extremity support and shift weight lateral to right safely  Pt will ambulate with greater than 50 percent weight bearing  On the right.       Long Term Goals: 5  weeks   Pt will ambulate with straight cane   Pt will be safe gait training x 200 feet     Plan   Plan of care Certification: 3/28/2022 to 4/27/2022.    Outpatient Physical Therapy 2 times weekly for 4 weeks to include the following interventions: Gait Training, Patient Education and Therapeutic Exercise.     Shadi Donohue, PT

## 2022-04-11 ENCOUNTER — CLINICAL SUPPORT (OUTPATIENT)
Dept: REHABILITATION | Facility: HOSPITAL | Age: 72
End: 2022-04-11
Payer: MEDICARE

## 2022-04-11 DIAGNOSIS — S78.111A ABOVE KNEE AMPUTATION OF RIGHT LOWER EXTREMITY: Primary | ICD-10-CM

## 2022-04-11 PROCEDURE — 97110 THERAPEUTIC EXERCISES: CPT | Mod: PN,CQ

## 2022-04-11 PROCEDURE — 97116 GAIT TRAINING THERAPY: CPT | Mod: PN,CQ

## 2022-04-11 NOTE — PROGRESS NOTES
"  Physical Therapy Treatment Note     Name: Mao Merritt  Clinic Number: [id number]    Therapy Diagnosis:   Encounter Diagnosis   Name Primary?    Above knee amputation of right lower extremity Yes     Physician: Seun Carrillo MD    Visit Date: 4/11/2022    Physician Orders: PT Eval and Treat    Medical Diagnosis from Referral: right above knee amputation , decreased mobility                                Evaluation Date: 3/28/2022  Updated Plan of Care Due : 4/27/2022  Authorization Period Expiration: [employer]   Plan of Care Expiration: approved 4 visits until 4/27  Visit # / Visits authorized: 2/ 4  PTA Visit #: 1    Time In: 1053  Time Out: 1123  Total Billable Time: 30 minutes    Precautions: Standard    Received Plan of Care per Shadi Donohue PT     Subjective     Pt reports: reports wearing prosthesis some on Saturday and some on Sunday; no pain  He was compliant with home exercise program.  Response to previous treatment: no problems    Pain: 0/10  Location: right leg      Objective     Mao received therapeutic exercises to develop strength and endurance for 15 minutes including:  Sit to stand x 10 repetitions   Marching x 10 repetitions   bilateral Hip abduction x 10 repetitions   bilateral hip extension x 10 repetitions   6" step ups LLE x 10 repetitions     Mao participated in gait training to improve functional mobility and safety for 15  minutes, including:  Side stepping left and right x 25ft each way with BUE assist  Forward gait with walker x 45ft x 2, mod independent with focus on increase WB of RLE     Home Exercises Provided and Patient Education Provided     Education provided: continue current home program    Written Home Exercises Provided: Patient instructed to cont prior HEP.  Exercises were reviewed and Mao was able to demonstrate them prior to the end of the session.  Mao demonstrated good  understanding of the education provided.     See EMR under Patient Instructions for exercises " "provided prior visit.    Assessment     Pt had multiple "clicks" during session today which increased discomfort; he has another sock he can add  Pt required rest breaks between/during activities  Mao Is progressing well towards his goals.   Pt prognosis is Good.     Pt will continue to benefit from skilled outpatient physical therapy to address the deficits listed in the problem list box on initial evaluation, provide pt/family education and to maximize pt's level of independence in the home and community environment.      Anticipated barriers to physical therapy: home compliance    Goals:  Short Term Goals: 4  weeks   Pt will be independent with home ex program (weight shifting and gait at this time)  Pt will be able to stand without upper extremity support and shift weight lateral to right safely  Pt will ambulate with greater than 50 percent weight bearing  On the right.      Long Term Goals: 5  weeks   Pt will ambulate with straight cane   Pt will be safe gait training x 200 feet     Plan     Plan of care Certification: 3/28/2022 to 4/27/2022.  Outpatient Physical Therapy 2 times weekly for 4 weeks to include the following interventions: Gait Training, Patient Education and Therapeutic Exercise.   Continue per Plan of Care and progress as pt able   Torrie Arteaga, PTA  4/11/2022         "

## 2022-04-14 ENCOUNTER — CLINICAL SUPPORT (OUTPATIENT)
Dept: REHABILITATION | Facility: HOSPITAL | Age: 72
End: 2022-04-14
Payer: MEDICARE

## 2022-04-14 DIAGNOSIS — S78.111A ABOVE KNEE AMPUTATION OF RIGHT LOWER EXTREMITY: Primary | ICD-10-CM

## 2022-04-14 DIAGNOSIS — R26.89 FUNCTIONAL GAIT ABNORMALITY: ICD-10-CM

## 2022-04-14 DIAGNOSIS — Z74.09 DECREASED FUNCTIONAL MOBILITY AND ENDURANCE: ICD-10-CM

## 2022-04-14 PROCEDURE — 97116 GAIT TRAINING THERAPY: CPT | Mod: PN,CQ

## 2022-04-14 PROCEDURE — 97110 THERAPEUTIC EXERCISES: CPT | Mod: PN,CQ

## 2022-04-14 NOTE — PROGRESS NOTES
"  Physical Therapy Treatment Note     Name: Mao Merritt  Clinic Number: [id number]    Therapy Diagnosis:   Encounter Diagnoses   Name Primary?    Above knee amputation of right lower extremity Yes    Functional gait abnormality     Decreased functional mobility and endurance      Physician: Seun Carrillo MD    Visit Date: 4/14/2022    Physician Orders: PT Eval and Treat    Medical Diagnosis from Referral: right above knee amputation , decreased mobility                                Evaluation Date: 3/28/2022  Updated Plan of Care Due : 4/27/2022  Authorization Period Expiration: [employer]   Plan of Care Expiration: approved 5 visits until 4/27  Visit # / Visits authorized: 3/ 5  PTA Visit #: 2    Time In: 1100  Time Out: 1135  Total Billable Time: 35 minutes    Precautions: Standard    Received Plan of Care per Shadi Donohue PT     Subjective     Pt reports: reports he put another sock on and it's helped with the rubbing, only wearing leg every now and then.  He was compliant with home exercise program.  Response to previous treatment: no problems    Pain: 0/10  Location: right leg      Objective     Mao received therapeutic exercises to develop strength and endurance for 15 minutes including:  Sit to stand x 10 repetitions   Sitting right hip flexion x 15  Sitting hip adduction with bolster x 15  6" step ups LLE x 10 repetitions     Mao participated in gait training to improve functional mobility and safety x 150' with walker and contact guard assistance X 1    Home Exercises Provided and Patient Education Provided     Education provided: continue current home program    Written Home Exercises Provided: Patient instructed to cont prior HEP.  Exercises were reviewed and Mao was able to demonstrate them prior to the end of the session.  Mao demonstrated good  understanding of the education provided.     See EMR under Patient Instructions for exercises provided prior visit.    Assessment     Pt had multiple " ""clicks" during session today which increased discomfort along medial knee line.  Pt required rest breaks between/during activities. Patient was standing at steps and was side stepping toward right (prosthetic side) and lost balance gently falling to fall, as he was holding onto railing of steps. LPTA  had stepped away from arms reach to move computer cart out the way. Fall was witnessed by physical therapist and other LPTA, patient denied any injury and none where noted by staff.  Patient denied needing medical attention. Clinic supervisor was notified.  Mao Is progressing well towards his goals.   Pt prognosis is Good.     Pt will continue to benefit from skilled outpatient physical therapy to address the deficits listed in the problem list box on initial evaluation, provide pt/family education and to maximize pt's level of independence in the home and community environment.      Anticipated barriers to physical therapy: home compliance    Goals:  Short Term Goals: 4  weeks   Pt will be independent with home ex program (weight shifting and gait at this time)  Pt will be able to stand without upper extremity support and shift weight lateral to right safely  Pt will ambulate with greater than 50 percent weight bearing  On the right.      Long Term Goals: 5  weeks   Pt will ambulate with straight cane   Pt will be safe gait training x 200 feet     Plan     Plan of care Certification: 3/28/2022 to 4/27/2022.  Outpatient Physical Therapy 2 times weekly for 4 weeks to include the following interventions: Gait Training, Patient Education and Therapeutic Exercise.   Continue per Plan of Care and progress as pt able   Zelda Jimenez, PTA  4/14/2022         "

## 2022-04-18 ENCOUNTER — CLINICAL SUPPORT (OUTPATIENT)
Dept: REHABILITATION | Facility: HOSPITAL | Age: 72
End: 2022-04-18
Payer: MEDICARE

## 2022-04-18 DIAGNOSIS — S78.111A ABOVE KNEE AMPUTATION OF RIGHT LOWER EXTREMITY: Primary | ICD-10-CM

## 2022-04-18 DIAGNOSIS — Z74.09 DECREASED FUNCTIONAL MOBILITY AND ENDURANCE: ICD-10-CM

## 2022-04-18 DIAGNOSIS — R26.89 FUNCTIONAL GAIT ABNORMALITY: ICD-10-CM

## 2022-04-18 PROCEDURE — 97110 THERAPEUTIC EXERCISES: CPT | Mod: PN,CQ

## 2022-04-18 PROCEDURE — 97116 GAIT TRAINING THERAPY: CPT | Mod: PN,CQ

## 2022-04-18 NOTE — PROGRESS NOTES
Physical Therapy Treatment Note     Name: Mao Merritt  Clinic Number: [id number]    Therapy Diagnosis:   Encounter Diagnoses   Name Primary?    Above knee amputation of right lower extremity Yes    Functional gait abnormality     Decreased functional mobility and endurance      Physician: Seun Carrillo MD    Visit Date: 4/18/2022    Physician Orders: PT Eval and Treat    Medical Diagnosis from Referral: right above knee amputation , decreased mobility                                Evaluation Date: 3/28/2022  Updated Plan of Care Due : 4/27/2022  Authorization Period Expiration: [employer]   Plan of Care Expiration: approved 5 visits until 4/27  Visit # / Visits authorized: 4 / 5  PTA Visit #: 2    Time In: 1052  Time Out: 1130  Total Billable Time: 38 minutes    Precautions: Standard    Received Plan of Care per Shadi Donohue PT     Subjective     Pt reports: I'm doing ok this am  He was compliant with home exercise program.  Response to previous treatment: no problems    Pain: 0/10  Location: right leg      Objective     Mao received therapeutic exercises to develop strength and endurance for 38 minutes including:  Sit to stand x 10 repetitions   Sitting right hip flexion x 15  Sitting hip adduction with bolster x 15  Bridging x 15  Supine right hip abduction 2 x 15 x 5# with towel  Prone props with glut sets x 10  Right prone hip abduction  With towel x 15  Supine right straight leg raise aarom x 15    Mao participated in gait training to improve functional mobility and safety x 250' with walker and contact guard assistance X 1    Home Exercises Provided and Patient Education Provided     Education provided: continue current home program    Written Home Exercises Provided: Patient instructed to cont prior HEP.  Exercises were reviewed and Mao was able to demonstrate them prior to the end of the session.  Mao demonstrated good  understanding of the education provided.     See EMR under Patient  Instructions for exercises provided prior visit.    Assessment     Patient noted to have toe inversion with prosthetic fitting this treatment.  Mao Is progressing well towards his goals.   Pt prognosis is Good.     Pt will continue to benefit from skilled outpatient physical therapy to address the deficits listed in the problem list box on initial evaluation, provide pt/family education and to maximize pt's level of independence in the home and community environment.      Anticipated barriers to physical therapy: home compliance    Goals:  Short Term Goals: 4  weeks   Pt will be independent with home ex program (weight shifting and gait at this time)  Pt will be able to stand without upper extremity support and shift weight lateral to right safely  Pt will ambulate with greater than 50 percent weight bearing  On the right.      Long Term Goals: 5  weeks   Pt will ambulate with straight cane   Pt will be safe gait training x 200 feet     Plan     Plan of care Certification: 3/28/2022 to 4/27/2022.  Outpatient Physical Therapy 2 times weekly for 4 weeks to include the following interventions: Gait Training, Patient Education and Therapeutic Exercise.   Continue per Plan of Care and progress as pt able   Zelda Jimenez, PTA  4/18/2022

## 2022-04-25 NOTE — PLAN OF CARE
"Physical Therapy Treatment Note      Name: Mao Merritt  Clinic Number: [id number]     Therapy Diagnosis:        Encounter Diagnoses   Name Primary?    Above knee amputation of right lower extremity Yes    Functional gait abnormality      Decreased functional mobility and endurance        Physician: Seun Carrillo MD     Visit Date: 4/14/2022     Physician Orders: PT Eval and Treat    Medical Diagnosis from Referral: right above knee amputation , decreased mobility                                Evaluation Date: 3/28/2022  Updated Plan of Care Due : 4/27/2022  Authorization Period Expiration: [employer]   Plan of Care Expiration: approved 5 visits until 4/27  Visit # / Visits authorized: 3/ 5  PTA Visit #: 2     Time In: 1100  Time Out: 1135  Total Billable Time: 35 minutes     Precautions: Standard     Received Plan of Care per Shadi Donohue PT      Subjective      Pt reports: reports he put another sock on and it's helped with the rubbing, only wearing leg every now and then.  He was compliant with home exercise program.  Response to previous treatment: no problems     Pain: 0/10  Location: right leg       Objective      Mao received therapeutic exercises to develop strength and endurance for 15 minutes including:  Sit to stand x 10 repetitions   Sitting right hip flexion x 15  Sitting hip adduction with bolster x 15  6" step ups LLE x 10 repetitions      Mao participated in gait training to improve functional mobility and safety x 150' with walker and contact guard assistance X 1     Home Exercises Provided and Patient Education Provided      Education provided: continue current home program     Written Home Exercises Provided: Patient instructed to cont prior HEP.  Exercises were reviewed and Mao was able to demonstrate them prior to the end of the session.  Mao demonstrated good  understanding of the education provided.      See EMR under Patient Instructions for exercises provided prior " "visit.     Assessment      Pt had multiple "clicks" during session today which increased discomfort along medial knee line.  Pt required rest breaks between/during activities. Patient was standing at steps and was side stepping toward right (prosthetic side) and lost balance gently falling to fall, as he was holding onto railing of steps. LPTA  had stepped away from arms reach to move computer cart out the way. Fall was witnessed by physical therapist and other LPTA, patient denied any injury and none where noted by staff.  Patient denied needing medical attention. Clinic supervisor was notified.  Mao Is progressing well towards his goals.   Pt prognosis is Good.      Pt will continue to benefit from skilled outpatient physical therapy to address the deficits listed in the problem list box on initial evaluation, provide pt/family education and to maximize pt's level of independence in the home and community environment.      Anticipated barriers to physical therapy: home compliance     Goals:  Short Term Goals: 4  weeks   Pt will be independent with home ex program (weight shifting and gait at this time)  Pt will be able to stand without upper extremity support and shift weight lateral to right safely  Pt will ambulate with greater than 50 percent weight bearing  On the right.      Long Term Goals: 5  weeks   Pt will ambulate with straight cane   Pt will be safe gait training x 200 feet      Plan         Outpatient Therapy Discharge Summary     Name: Mao Merritt  Clinic Number: [id number]    Therapy Diagnosis:   Encounter Diagnoses   Name Primary?    Above knee amputation of right lower extremity Yes    Functional gait abnormality     Decreased functional mobility and endurance      Physician: Seun Carrillo MD         Assessment    Goals:  Pt did not return to tx . Insurance approval lapsed.     Discharge reason: Patient has reached the maximum rehab potential for the present time and Patient has completed " allowable visits authorized by insurance    Plan   This patient is discharged from Physical Therapy.    Shadi Donohue, PT

## 2022-09-16 DIAGNOSIS — Z89.611 HX OF AKA (ABOVE KNEE AMPUTATION), RIGHT: Primary | ICD-10-CM

## 2022-09-28 ENCOUNTER — CLINICAL SUPPORT (OUTPATIENT)
Dept: REHABILITATION | Facility: HOSPITAL | Age: 72
End: 2022-09-28
Payer: MEDICARE

## 2022-09-28 DIAGNOSIS — Z89.611 HX OF AKA (ABOVE KNEE AMPUTATION), RIGHT: ICD-10-CM

## 2022-09-28 PROCEDURE — 97161 PT EVAL LOW COMPLEX 20 MIN: CPT | Mod: PN

## 2022-09-28 NOTE — PLAN OF CARE
"[facility]  ASSISTIVE TECHNOLOGY EVALUATION    Date: 9/28/2022   Name: Mao Merritt  Clinic Number: [id number]    Therapy Diagnosis:   Encounter Diagnosis   Name Primary?    Hx of AKA (above knee amputation), right      Physician: Seun Carrillo MD    Physician Orders: PT Eval and Treat   Medical Diagnosis from Referral: AKA  Evaluation Date: 9/28/2022  Visit # / Visits authorized: 1/ 1    Time In: 0905  Time Out: 0940  Total Appointment Time (timed & untimed codes): 35 minutes    Precautions: Standard    Past Medical History  Right AKA 2 - 3 years ago, a fib, HTN, peripheral vascular disease.     Pain at time of evaluation: 0/10 None  Pain at worst: 4/10 Tolerable  Pain at rest: 0/10 None    Factors that increase pain:   Phantom pain    Factors that decrease pain:  rest    Current Durable Medical Equipment  MWC, AKA prosthesis, RW, cane, Shower chair, BSC,     Home environment  Patient lives with wife in a one story home with a ramp at the entrance  Interior door width: 30"  Exterior door width: 35"  Comments:     Patient's mobility complaints: Patient unable to ambulate with RW and prosthesis at this time.  He is using a MWC for all mobility.  His wife reports he cannot complete his household mobility with the MWC due to weakness and UE pain.  His wife states she has to push him to the bathroom because he cannot self propel himself quickly enough to avoid accidents. He cannot propel himself functionally on a daily basis.  He fell 2 weeks ago while trying to transfer into his MWC.      Objective Evaluation:    ROM Right upper extremity  Left upper extremity   Right lower extremity  Left lower extremity    Shoulder flexion  WFL WFL Hip flexion WFL WFL   Shoulder IR/ER WFL WFL Hip extension  WFL WFL   Shoulder ABD WFL WFL Knee extension  N/A WFL   Elbow flexion/ext WFL WFL Knee flexion  N/A WFL   Wrist flexion/ext WFL WFL Ankle DF N/A WFL   Finger flexion/ext WFL WFL Ankle PF N/A WFL             Strength " Right upper extremity  Left upper extremity   Right lower extremity  Left lower extremity    Shoulder flex +3/5 +3/5 Hip flexion  +3/5 +3/5   Shoulder IR/ER +3/5 +3/5 Hip extension  +3/5 +3/5   Shoulder ABD +3/5 +3/5 Knee extension  N/A +3/5   Elbow flexion/ext +3/5 +3/5 Knee flexion  N/A +3/5   Wrist flexion/ext +3/5 +3/5 Ankle DF N/A +3/5   Finger flexion/ext +3/5 +3/5 Ankle PF N/A +3/5             Sensation: WFL    History of press+3/5ure sores: history of poor circulation in the LE's which has led to the right AKA.     Transfers:  Sit to/from Stand  Minimal Assistance  Stand Pivot Transfer Minimal Assistance  Supine to/from Sit Minimal Assistance    Cognitive Status: WFL    Activities of Daily Living:  Feeding: Independent  Dressing: Moderate Assistance  Bathing: Moderate Assistance  Toileting: Moderate Assistance    Balance Assessment:  Static Sit Independent  Dynamic Sit Modified Independent  Static Stand Minimal Assistance  Dynamic Stand Minimal Assistance    Postural Assessment:  Head and Neck: forward head posture  Upper Extremities: WFL  Trunk: WFL  Pelvis: posterior pelvic tilt  Lower Extremities: WFL    Spasticity: none    Gait Analysis: patient not functionally ambulatory    Body Measurements(all in inches):  Seat to top of head 30   Hip width 19   Chest width 14   Shoulder width 22   Outer knee NT   Inner knee NT     Right  Left  Shoulder Height 21.5 21.5   Axilla to seat 14.5 14.5   Elbow to seat 7 7   Thigh Length 14 20   Lower leg length N/A 18     DME Provider: patient has chosen to use [employer]    Nutrition: WFL  Weight loss/gain in past 2 months: no  Difficulty swallowing: no    Assessment and Recommendations:    A walker will not meet this patient's mobility needs secondary to patient is not functionally ambulatory with any type of AD due to generalized weakness, fear of and history of falling, and inability to tolerate prosthesis wear.    A manual wheelchair will not meet this  patient's mobility needs because patient cannot functionally self propel even an optimally configured MWC due to UE weakness.    A power wheelchair is needed to meet this patient's mobility needs because: he will be able to complete his household mobility independently and safely to complete MRADLs including toileting, kitchen for meals, etc.    The wheelchair recommended is: Group 2 PWC Tamar Elite with right side joystick and Captains seating for independent transfers and operation with a pelvic belt for anterior  pelvic support and safety during propulsion.        Electronically signed by:  MICHELLE NAVARRO PT, ATP  09/28/2022      I CERTIFY THE NEED FOR THESE SERVICES FURNISHED UNDER THIS PLAN OF TREATMENT AND WHILE UNDER MY CARE.    Physician's comments:      Physician's Signature: _________________________________________  Date: __________________________

## 2022-10-05 ENCOUNTER — HOSPITAL ENCOUNTER (EMERGENCY)
Facility: HOSPITAL | Age: 72
Discharge: HOME | End: 2022-10-05
Attending: FAMILY MEDICINE
Payer: MEDICARE

## 2022-10-05 VITALS
OXYGEN SATURATION: 98 % | WEIGHT: 140 LBS | SYSTOLIC BLOOD PRESSURE: 127 MMHG | DIASTOLIC BLOOD PRESSURE: 85 MMHG | RESPIRATION RATE: 16 BRPM | HEART RATE: 97 BPM | TEMPERATURE: 99 F | BODY MASS INDEX: 21.22 KG/M2 | HEIGHT: 68 IN

## 2022-10-05 DIAGNOSIS — N39.0 URINARY TRACT INFECTION WITHOUT HEMATURIA, SITE UNSPECIFIED: Primary | ICD-10-CM

## 2022-10-05 DIAGNOSIS — R25.1 EPISODE OF SHAKING: ICD-10-CM

## 2022-10-05 LAB
ALBUMIN SERPL BCP-MCNC: 3 G/DL (ref 3.5–5)
ALBUMIN/GLOB SERPL: 0.8 {RATIO}
ALP SERPL-CCNC: 96 U/L (ref 45–115)
ALT SERPL W P-5'-P-CCNC: 59 U/L (ref 16–61)
ANION GAP SERPL CALCULATED.3IONS-SCNC: 13 MMOL/L (ref 7–16)
AST SERPL W P-5'-P-CCNC: 251 U/L (ref 15–37)
BACTERIA #/AREA URNS HPF: ABNORMAL /HPF
BASOPHILS # BLD AUTO: 0.04 K/UL (ref 0–0.2)
BASOPHILS NFR BLD AUTO: 0.4 % (ref 0–1)
BILIRUB SERPL-MCNC: 1.2 MG/DL (ref ?–1.2)
BILIRUB UR QL STRIP: NEGATIVE
BUN SERPL-MCNC: 10 MG/DL (ref 7–18)
BUN/CREAT SERPL: 11 (ref 6–20)
CALCIUM SERPL-MCNC: 9.1 MG/DL (ref 8.5–10.1)
CHLORIDE SERPL-SCNC: 99 MMOL/L (ref 98–107)
CLARITY UR: ABNORMAL
CO2 SERPL-SCNC: 30 MMOL/L (ref 21–32)
COLOR UR: ABNORMAL
CREAT SERPL-MCNC: 0.93 MG/DL (ref 0.7–1.3)
DIFFERENTIAL METHOD BLD: ABNORMAL
EGFR (NO RACE VARIABLE) (RUSH/TITUS): 87 ML/MIN/1.73M²
EOSINOPHIL # BLD AUTO: 0.03 K/UL (ref 0–0.5)
EOSINOPHIL NFR BLD AUTO: 0.3 % (ref 1–4)
ERYTHROCYTE [DISTWIDTH] IN BLOOD BY AUTOMATED COUNT: 19.5 % (ref 11.5–14.5)
GLOBULIN SER-MCNC: 3.7 G/DL (ref 2–4)
GLUCOSE SERPL-MCNC: 93 MG/DL (ref 74–106)
GLUCOSE UR STRIP-MCNC: NORMAL MG/DL
HCT VFR BLD AUTO: 35 % (ref 40–54)
HGB BLD-MCNC: 11.6 G/DL (ref 13.5–18)
IMM GRANULOCYTES # BLD AUTO: 0.06 K/UL (ref 0–0.04)
IMM GRANULOCYTES NFR BLD: 0.6 % (ref 0–0.4)
KETONES UR STRIP-SCNC: ABNORMAL MG/DL
LEUKOCYTE ESTERASE UR QL STRIP: ABNORMAL
LYMPHOCYTES # BLD AUTO: 2.55 K/UL (ref 1–4.8)
LYMPHOCYTES NFR BLD AUTO: 27.4 % (ref 27–41)
MAGNESIUM SERPL-MCNC: 1.4 MG/DL (ref 1.7–2.3)
MCH RBC QN AUTO: 28.7 PG (ref 27–31)
MCHC RBC AUTO-ENTMCNC: 33.1 G/DL (ref 32–36)
MCV RBC AUTO: 86.6 FL (ref 80–96)
MONOCYTES # BLD AUTO: 0.84 K/UL (ref 0–0.8)
MONOCYTES NFR BLD AUTO: 9 % (ref 2–6)
MPC BLD CALC-MCNC: 9.9 FL (ref 9.4–12.4)
MUCOUS THREADS #/AREA URNS HPF: ABNORMAL /HPF
NEUTROPHILS # BLD AUTO: 5.77 K/UL (ref 1.8–7.7)
NEUTROPHILS NFR BLD AUTO: 62.3 % (ref 53–65)
NITRITE UR QL STRIP: NEGATIVE
NRBC # BLD AUTO: 0.02 X10E3/UL
NRBC, AUTO (.00): 0.2 %
PH UR STRIP: 5.5 PH UNITS
PLATELET # BLD AUTO: 235 K/UL (ref 150–400)
POTASSIUM SERPL-SCNC: 3.2 MMOL/L (ref 3.5–5.1)
PROT SERPL-MCNC: 6.7 G/DL (ref 6.4–8.2)
PROT UR QL STRIP: 50
RBC # BLD AUTO: 4.04 M/UL (ref 4.6–6.2)
RBC # UR STRIP: NEGATIVE /UL
RBC #/AREA URNS HPF: ABNORMAL /HPF
SODIUM SERPL-SCNC: 139 MMOL/L (ref 136–145)
SP GR UR STRIP: 1.02
SQUAMOUS #/AREA URNS LPF: ABNORMAL /LPF
UROBILINOGEN UR STRIP-ACNC: 8 MG/DL
WBC # BLD AUTO: 9.29 K/UL (ref 4.5–11)
WBC #/AREA URNS HPF: ABNORMAL /HPF
WBC CLUMPS, UA: ABNORMAL /HPF

## 2022-10-05 PROCEDURE — 80053 COMPREHEN METABOLIC PANEL: CPT

## 2022-10-05 PROCEDURE — 96366 THER/PROPH/DIAG IV INF ADDON: CPT

## 2022-10-05 PROCEDURE — 25000003 PHARM REV CODE 250

## 2022-10-05 PROCEDURE — 36415 COLL VENOUS BLD VENIPUNCTURE: CPT

## 2022-10-05 PROCEDURE — 87077 CULTURE AEROBIC IDENTIFY: CPT

## 2022-10-05 PROCEDURE — 96365 THER/PROPH/DIAG IV INF INIT: CPT

## 2022-10-05 PROCEDURE — 99284 PR EMERGENCY DEPT VISIT,LEVEL IV: ICD-10-PCS | Mod: ,,, | Performed by: FAMILY MEDICINE

## 2022-10-05 PROCEDURE — 83735 ASSAY OF MAGNESIUM: CPT

## 2022-10-05 PROCEDURE — 96368 THER/DIAG CONCURRENT INF: CPT

## 2022-10-05 PROCEDURE — 63600175 PHARM REV CODE 636 W HCPCS

## 2022-10-05 PROCEDURE — 99284 EMERGENCY DEPT VISIT MOD MDM: CPT

## 2022-10-05 PROCEDURE — 81001 URINALYSIS AUTO W/SCOPE: CPT

## 2022-10-05 PROCEDURE — 85025 COMPLETE CBC W/AUTO DIFF WBC: CPT

## 2022-10-05 PROCEDURE — 87086 URINE CULTURE/COLONY COUNT: CPT

## 2022-10-05 PROCEDURE — 99284 EMERGENCY DEPT VISIT MOD MDM: CPT | Mod: ,,, | Performed by: FAMILY MEDICINE

## 2022-10-05 RX ORDER — CEPHALEXIN 500 MG/1
500 CAPSULE ORAL EVERY 12 HOURS
Qty: 10 CAPSULE | Refills: 0 | Status: SHIPPED | OUTPATIENT
Start: 2022-10-05 | End: 2022-10-10

## 2022-10-05 RX ORDER — MAGNESIUM SULFATE HEPTAHYDRATE 40 MG/ML
2 INJECTION, SOLUTION INTRAVENOUS
Status: COMPLETED | OUTPATIENT
Start: 2022-10-05 | End: 2022-10-05

## 2022-10-05 RX ADMIN — FOLIC ACID: 5 INJECTION, SOLUTION INTRAMUSCULAR; INTRAVENOUS; SUBCUTANEOUS at 01:10

## 2022-10-05 RX ADMIN — CEFTRIAXONE SODIUM 1 G: 1 INJECTION, POWDER, FOR SOLUTION INTRAMUSCULAR; INTRAVENOUS at 05:10

## 2022-10-05 RX ADMIN — MAGNESIUM SULFATE IN WATER 2 G: 40 INJECTION, SOLUTION INTRAVENOUS at 05:10

## 2022-10-05 NOTE — ED TRIAGE NOTES
Pt son siad pt head went back and wheelchair started shaking and wouldn't respond 1 hr ago,  pt now back to baseline on arival to ed

## 2022-10-05 NOTE — ED PROVIDER NOTES
Encounter Date: 10/5/2022       History     Chief Complaint   Patient presents with    Altered Mental Status     Patient is a 72 year old male that presents to [facility] ED with concern for syncope. The patient is accompanied by his wife at bedside. Patient does not know if anything happened, he states that he was in his wheelchair. He family states that they heard some shaking and though they saw his eyes roll back in his head. They stated he was at his baseline mentation from then on. The patient states he is a daily alcohol drinker and drinks on average 1 pint of whiskey per day.     Review of patient's allergies indicates:  No Known Allergies  Past Medical History:   Diagnosis Date    Hypertension     PVD (peripheral vascular disease)      Past Surgical History:   Procedure Laterality Date    AMPUTATION, LOWER LIMB Right 12/2020     Family History   Problem Relation Age of Onset    Hypertension Mother      Social History     Tobacco Use    Smoking status: Some Days     Packs/day: 3.00     Types: Cigars, Cigarettes    Smokeless tobacco: Never   Substance Use Topics    Alcohol use: Yes     Comment: 1 pint everyday    Drug use: Never     Review of Systems   Constitutional:  Positive for appetite change. Negative for chills, fatigue and fever.   HENT:  Negative for ear discharge, ear pain, sinus pressure, sneezing and sore throat.    Respiratory:  Negative for cough, choking, chest tightness and shortness of breath.    Cardiovascular:  Positive for chest pain and palpitations. Negative for leg swelling.   Gastrointestinal:  Negative for constipation, diarrhea and vomiting.   Genitourinary:  Negative for decreased urine volume, flank pain and urgency.   Musculoskeletal:  Negative for back pain, gait problem and joint swelling.   Skin:  Negative for rash.   Neurological:  Negative for tremors, seizures, syncope, speech difficulty, weakness, light-headedness, numbness and headaches.   Psychiatric/Behavioral:   Negative for behavioral problems, confusion, dysphoric mood and self-injury. The patient is not nervous/anxious and is not hyperactive.      Physical Exam     Initial Vitals [10/05/22 1221]   BP Pulse Resp Temp SpO2   127/85 97 16 99.2 °F (37.3 °C) 98 %      MAP       --         Physical Exam    Vitals reviewed.  Constitutional: Vital signs are normal. He appears well-developed and well-nourished. He is cooperative. No distress.   HENT:   Head: Normocephalic and atraumatic.   Nose: Nose normal.   Mouth/Throat: Oropharynx is clear and moist and mucous membranes are normal. He does not have dentures. No lacerations.   Eyes: Conjunctivae, EOM and lids are normal. Pupils are equal, round, and reactive to light.   Cardiovascular:  Normal rate, regular rhythm, normal heart sounds and intact distal pulses.           Abdominal: Abdomen is soft. Bowel sounds are normal.   Musculoskeletal:      Right Lower Extremity: Right leg is amputated above knee.     Neurological: He is alert and oriented to person, place, and time. He has normal strength. No cranial nerve deficit or sensory deficit. GCS eye subscore is 4. GCS verbal subscore is 5. GCS motor subscore is 6.   Psychiatric: He has a normal mood and affect. His behavior is normal. Judgment and thought content normal.       Medical Screening Exam   See Full Note    ED Course   Procedures  Labs Reviewed   CULTURE, URINE - Abnormal; Notable for the following components:       Result Value    Culture, Urine >100,000 Streptococcus agalactiae (Group B) (*)     All other components within normal limits   COMPREHENSIVE METABOLIC PANEL - Abnormal; Notable for the following components:    Potassium 3.2 (*)     Albumin 3.0 (*)      (*)     All other components within normal limits   URINALYSIS, REFLEX TO URINE CULTURE - Abnormal; Notable for the following components:    Color, UA Light Orange (*)     Leukocytes, UA Large (*)     Protein, UA 50 (*)     Urobilinogen, UA 8 (*)      All other components within normal limits   MAGNESIUM - Abnormal; Notable for the following components:    Magnesium 1.4 (*)     All other components within normal limits   CBC WITH DIFFERENTIAL - Abnormal; Notable for the following components:    RBC 4.04 (*)     Hemoglobin 11.6 (*)     Hematocrit 35.0 (*)     RDW 19.5 (*)     Monocytes % 9.0 (*)     Eosinophils % 0.3 (*)     Immature Granulocytes % 0.6 (*)     nRBC, Auto 0.2 (*)     Monocytes, Absolute 0.84 (*)     Immature Granulocytes, Absolute 0.06 (*)     nRBC, Absolute 0.02 (*)     All other components within normal limits   URINALYSIS, MICROSCOPIC - Abnormal; Notable for the following components:    WBC, UA 11-15 (*)     Bacteria, UA Moderate (*)     Squamous Epithelial Cells, UA Occasional (*)     WBC Clumps Few (*)     Mucus, UA Moderate (*)     All other components within normal limits   CBC W/ AUTO DIFFERENTIAL    Narrative:     The following orders were created for panel order CBC auto differential.  Procedure                               Abnormality         Status                     ---------                               -----------         ------                     CBC with Differential[[id number]]        Abnormal            Final result               Manual Differential[[id number]]                                                           Please view results for these tests on the individual orders.   EXTRA TUBES    Narrative:     The following orders were created for panel order EXTRA TUBES.  Procedure                               Abnormality         Status                     ---------                               -----------         ------                     Light Green Top Hold[[id number]]                             In process                   Please view results for these tests on the individual orders.   LIGHT GREEN TOP HOLD   EXTRA TUBES    Narrative:     The following orders were created for panel order EXTRA TUBES.  Procedure                                Abnormality         Status                     ---------                               -----------         ------                     Light Blue Top Hold[[id number]]                              In process                 Light Green Top Hold[[id number]]                             In process                   Please view results for these tests on the individual orders.   LIGHT BLUE TOP HOLD   LIGHT GREEN TOP HOLD          Imaging Results    None          Medications   sodium chloride 0.9% 1,000 mL with mvi, (ADULT) no.4 with vit K 3,300 unit- 150 mcg/10 mL 10 mL, thiamine 100 mg, folic acid 1 mg infusion ( Intravenous Stopped 10/5/22 1945)   cefTRIAXone (ROCEPHIN) 1 g in dextrose 5 % in water (D5W) 5 % 50 mL IVPB (MB+) (0 g Intravenous Stopped 10/5/22 1830)   magnesium sulfate 2g in water 50mL IVPB (premix) (0 g Intravenous Stopped 10/5/22 1945)                Attending Attestation:   Physician Attestation Statement for Resident:  As the supervising MD   Physician Attestation Statement: I have personally seen and examined this patient.   I agree with the above history.  -:   As the supervising MD I agree with the above PE.     As the supervising MD I agree with the above treatment, course, plan, and disposition.                  ED Course as of 10/14/22 2346   Wed Oct 05, 2022   1558 CBC auto differential [AR]      ED Course User Index  [AR] Siddharth Green MD          Clinical Impression:   Final diagnoses:  [R25.1] Episode of shaking  [N39.0] Urinary tract infection without hematuria, site unspecified (Primary)      ED Disposition Condition    Discharge Stable          ED Prescriptions       Medication Sig Dispense Start Date End Date Auth. Provider    cephALEXin (KEFLEX) 500 MG capsule () Take 1 capsule (500 mg total) by mouth every 12 (twelve) hours. for 5 days 10 capsule 10/5/2022 10/10/2022 Siddharth Green MD          Follow-up Information    None          Darlene Cook MD  10/05/22  1739       Siddharth Green MD  Resident  10/05/22 1802       Darlene Cook MD  10/14/22 5060

## 2022-10-06 ENCOUNTER — TELEPHONE (OUTPATIENT)
Dept: EMERGENCY MEDICINE | Facility: HOSPITAL | Age: 72
End: 2022-10-06
Payer: MEDICARE

## 2022-10-07 LAB — UA COMPLETE W REFLEX CULTURE PNL UR: ABNORMAL

## 2022-12-21 ENCOUNTER — OFFICE VISIT (OUTPATIENT)
Dept: SURGERY | Facility: CLINIC | Age: 72
End: 2022-12-21
Attending: SURGERY
Payer: MEDICARE

## 2022-12-21 VITALS
BODY MASS INDEX: 21.22 KG/M2 | RESPIRATION RATE: 20 BRPM | DIASTOLIC BLOOD PRESSURE: 90 MMHG | HEART RATE: 86 BPM | WEIGHT: 140 LBS | SYSTOLIC BLOOD PRESSURE: 162 MMHG | HEIGHT: 68 IN | OXYGEN SATURATION: 98 % | TEMPERATURE: 99 F

## 2022-12-21 DIAGNOSIS — Z12.11 SCREENING FOR MALIGNANT NEOPLASM OF COLON: Primary | ICD-10-CM

## 2022-12-21 PROCEDURE — 1126F AMNT PAIN NOTED NONE PRSNT: CPT | Mod: CPTII,,, | Performed by: SURGERY

## 2022-12-21 PROCEDURE — 1126F PR PAIN SEVERITY QUANTIFIED, NO PAIN PRESENT: ICD-10-PCS | Mod: CPTII,,, | Performed by: SURGERY

## 2022-12-21 PROCEDURE — 3077F PR MOST RECENT SYSTOLIC BLOOD PRESSURE >= 140 MM HG: ICD-10-PCS | Mod: CPTII,,, | Performed by: SURGERY

## 2022-12-21 PROCEDURE — 1159F PR MEDICATION LIST DOCUMENTED IN MEDICAL RECORD: ICD-10-PCS | Mod: CPTII,,, | Performed by: SURGERY

## 2022-12-21 PROCEDURE — 3077F SYST BP >= 140 MM HG: CPT | Mod: CPTII,,, | Performed by: SURGERY

## 2022-12-21 PROCEDURE — 3008F BODY MASS INDEX DOCD: CPT | Mod: CPTII,,, | Performed by: SURGERY

## 2022-12-21 PROCEDURE — 3080F PR MOST RECENT DIASTOLIC BLOOD PRESSURE >= 90 MM HG: ICD-10-PCS | Mod: CPTII,,, | Performed by: SURGERY

## 2022-12-21 PROCEDURE — 99214 OFFICE O/P EST MOD 30 MIN: CPT | Mod: PBBFAC | Performed by: SURGERY

## 2022-12-21 PROCEDURE — 3008F PR BODY MASS INDEX (BMI) DOCUMENTED: ICD-10-PCS | Mod: CPTII,,, | Performed by: SURGERY

## 2022-12-21 PROCEDURE — 3080F DIAST BP >= 90 MM HG: CPT | Mod: CPTII,,, | Performed by: SURGERY

## 2022-12-21 PROCEDURE — 1159F MED LIST DOCD IN RCRD: CPT | Mod: CPTII,,, | Performed by: SURGERY

## 2022-12-21 PROCEDURE — 99204 PR OFFICE/OUTPT VISIT, NEW, LEVL IV, 45-59 MIN: ICD-10-PCS | Mod: S$PBB,,, | Performed by: SURGERY

## 2022-12-21 PROCEDURE — 99204 OFFICE O/P NEW MOD 45 MIN: CPT | Mod: S$PBB,,, | Performed by: SURGERY

## 2022-12-21 RX ORDER — SODIUM CHLORIDE, SODIUM LACTATE, POTASSIUM CHLORIDE, CALCIUM CHLORIDE 600; 310; 30; 20 MG/100ML; MG/100ML; MG/100ML; MG/100ML
INJECTION, SOLUTION INTRAVENOUS CONTINUOUS
Status: CANCELLED | OUTPATIENT
Start: 2022-12-21

## 2022-12-21 NOTE — PROGRESS NOTES
General Surgery History and Physical      Patient ID: Mao Merritt is a 72 y.o. male.    Chief Complaint: Consult (Here for C-Scope consult.  )      HPI:  Patient is a 72 who is here for evaluation for a colonoscopy.  He has never had a colonoscopy before.  They deny any abdominal pain, nausea, weight loss, constipation, nor any blood in stool.  They deny any family history of colon cancer.  Dr. Flores is his heart doctor and says he is doing well.  Does have a defibrillator and history of PVD      Review of Systems   Constitutional:  Negative for activity change, appetite change, fatigue and fever.   HENT:  Negative for trouble swallowing.    Respiratory:  Negative for cough and shortness of breath.    Cardiovascular:  Negative for chest pain and palpitations.   Gastrointestinal:  Negative for abdominal distention, abdominal pain, blood in stool, constipation and diarrhea.   Genitourinary:  Negative for flank pain.   Musculoskeletal:  Negative for neck pain and neck stiffness.   Neurological:  Negative for weakness.     Current Outpatient Medications   Medication Sig Dispense Refill    aspirin 325 MG tablet Take 325 mg by mouth once daily.      metoprolol tartrate (LOPRESSOR) 25 MG tablet Take 25 mg by mouth 2 (two) times daily.       No current facility-administered medications for this visit.       Review of patient's allergies indicates:  No Known Allergies    Past Medical History:   Diagnosis Date    Hypertension     PVD (peripheral vascular disease)        Past Surgical History:   Procedure Laterality Date    AMPUTATION, LOWER LIMB Right 12/2020       Family History   Problem Relation Age of Onset    Hypertension Mother        Social History     Socioeconomic History    Marital status:    Occupational History    Occupation: retired   Tobacco Use    Smoking status: Some Days     Packs/day: 3.00     Types:  Cigars, Cigarettes    Smokeless tobacco: Never   Substance and Sexual Activity    Alcohol use: Yes     Comment: 1 pint everyday    Drug use: Never    Sexual activity: Not Currently       Vitals:    12/21/22 1300   BP: (!) 162/90   Pulse: 86   Resp: 20   Temp: 98.5 °F (36.9 °C)       Physical Exam  Constitutional:       General: He is not in acute distress.  HENT:      Head: Normocephalic.   Cardiovascular:      Rate and Rhythm: Normal rate and regular rhythm.      Pulses: Normal pulses.   Pulmonary:      Effort: Pulmonary effort is normal. No respiratory distress.      Breath sounds: Normal breath sounds.   Abdominal:      General: Abdomen is flat. There is no distension.      Palpations: Abdomen is soft.      Tenderness: There is no abdominal tenderness.   Musculoskeletal:         General: Deformity (right AKA) present. Normal range of motion.   Skin:     General: Skin is warm.   Neurological:      General: No focal deficit present.      Mental Status: He is oriented to person, place, and time.       Assessment & Plan:    Screening for malignant neoplasm of colon        Patient to go to the operating room for a colonoscopy. Risks and benefits explained to the patient including risk of bleeding, infection, missed lesion, perforation, and possible need for additional operation. All questions were answered.

## 2023-01-18 ENCOUNTER — HOSPITAL ENCOUNTER (OUTPATIENT)
Dept: GASTROENTEROLOGY | Facility: HOSPITAL | Age: 73
Discharge: HOME | End: 2023-01-18
Attending: SURGERY
Payer: MEDICARE

## 2023-01-18 ENCOUNTER — ANESTHESIA (OUTPATIENT)
Dept: GASTROENTEROLOGY | Facility: HOSPITAL | Age: 73
End: 2023-01-18
Payer: MEDICARE

## 2023-01-18 ENCOUNTER — ANESTHESIA EVENT (OUTPATIENT)
Dept: GASTROENTEROLOGY | Facility: HOSPITAL | Age: 73
End: 2023-01-18
Payer: MEDICARE

## 2023-01-18 VITALS
OXYGEN SATURATION: 100 % | RESPIRATION RATE: 18 BRPM | HEART RATE: 81 BPM | TEMPERATURE: 97 F | BODY MASS INDEX: 21.29 KG/M2 | SYSTOLIC BLOOD PRESSURE: 123 MMHG | DIASTOLIC BLOOD PRESSURE: 80 MMHG | WEIGHT: 140 LBS

## 2023-01-18 DIAGNOSIS — Z12.11 SCREENING FOR MALIGNANT NEOPLASM OF COLON: ICD-10-CM

## 2023-01-18 PROCEDURE — 45380 COLONOSCOPY AND BIOPSY: CPT | Mod: PT,59 | Performed by: SURGERY

## 2023-01-18 PROCEDURE — 63600175 PHARM REV CODE 636 W HCPCS: Performed by: NURSE ANESTHETIST, CERTIFIED REGISTERED

## 2023-01-18 PROCEDURE — 88305 SURGICAL PATHOLOGY: ICD-10-PCS | Mod: 26,,, | Performed by: PATHOLOGY

## 2023-01-18 PROCEDURE — 88305 TISSUE EXAM BY PATHOLOGIST: CPT | Mod: TC,SUR | Performed by: SURGERY

## 2023-01-18 PROCEDURE — 45385 COLONOSCOPY W/LESION REMOVAL: CPT | Mod: PT | Performed by: SURGERY

## 2023-01-18 PROCEDURE — 37000009 HC ANESTHESIA EA ADD 15 MINS

## 2023-01-18 PROCEDURE — 45385 PR COLONOSCOPY,REMV LESN,SNARE: ICD-10-PCS | Mod: PT,,, | Performed by: SURGERY

## 2023-01-18 PROCEDURE — 45385 COLONOSCOPY W/LESION REMOVAL: CPT | Mod: PT,,, | Performed by: SURGERY

## 2023-01-18 PROCEDURE — D9220A PRA ANESTHESIA: ICD-10-PCS | Mod: PT,,, | Performed by: NURSE ANESTHETIST, CERTIFIED REGISTERED

## 2023-01-18 PROCEDURE — 25000003 PHARM REV CODE 250: Performed by: NURSE ANESTHETIST, CERTIFIED REGISTERED

## 2023-01-18 PROCEDURE — 45380 PR COLONOSCOPY,BIOPSY: ICD-10-PCS | Mod: PT,59,, | Performed by: SURGERY

## 2023-01-18 PROCEDURE — D9220A PRA ANESTHESIA: Mod: PT,,, | Performed by: NURSE ANESTHETIST, CERTIFIED REGISTERED

## 2023-01-18 PROCEDURE — C1773 RET DEV, INSERTABLE: HCPCS

## 2023-01-18 PROCEDURE — 88305 TISSUE EXAM BY PATHOLOGIST: CPT | Mod: 26,,, | Performed by: PATHOLOGY

## 2023-01-18 PROCEDURE — 45380 COLONOSCOPY AND BIOPSY: CPT | Mod: PT,59,, | Performed by: SURGERY

## 2023-01-18 PROCEDURE — 37000008 HC ANESTHESIA 1ST 15 MINUTES

## 2023-01-18 RX ORDER — PROPOFOL 10 MG/ML
VIAL (ML) INTRAVENOUS
Status: DISCONTINUED | OUTPATIENT
Start: 2023-01-18 | End: 2023-01-18

## 2023-01-18 RX ORDER — SODIUM CHLORIDE 9 MG/ML
INJECTION, SOLUTION INTRAVENOUS CONTINUOUS PRN
Status: DISCONTINUED | OUTPATIENT
Start: 2023-01-18 | End: 2023-01-18

## 2023-01-18 RX ORDER — GLYCOPYRROLATE 0.2 MG/ML
INJECTION INTRAMUSCULAR; INTRAVENOUS
Status: DISCONTINUED | OUTPATIENT
Start: 2023-01-18 | End: 2023-01-18

## 2023-01-18 RX ORDER — LIDOCAINE HYDROCHLORIDE 20 MG/ML
INJECTION INTRAVENOUS
Status: DISCONTINUED | OUTPATIENT
Start: 2023-01-18 | End: 2023-01-18

## 2023-01-18 RX ADMIN — PROPOFOL 50 MG: 10 INJECTION, EMULSION INTRAVENOUS at 08:01

## 2023-01-18 RX ADMIN — SODIUM CHLORIDE: 0.9 INJECTION, SOLUTION INTRAVENOUS at 08:01

## 2023-01-18 RX ADMIN — GLYCOPYRROLATE 0.2 MG: 0.2 INJECTION, SOLUTION INTRAMUSCULAR; INTRAVENOUS at 08:01

## 2023-01-18 RX ADMIN — LIDOCAINE HYDROCHLORIDE 100 MG: 20 INJECTION INTRAVENOUS at 08:01

## 2023-01-18 RX ADMIN — PROPOFOL 150 MG: 10 INJECTION, EMULSION INTRAVENOUS at 08:01

## 2023-01-18 NOTE — ANESTHESIA POSTPROCEDURE EVALUATION
Anesthesia Post Evaluation    Patient: Mao Merritt    Procedure(s) Performed: * No procedures listed *    Final Anesthesia Type: general      Patient location during evaluation: GI PACU  Patient participation: Yes- Able to Participate  Level of consciousness: responds to stimulation  Post-procedure vital signs: reviewed and stable  Pain management: adequate  Airway patency: patent  PRACHI mitigation strategies: Multimodal analgesia  PONV status at discharge: No PONV  Anesthetic complications: no      Cardiovascular status: blood pressure returned to baseline  Respiratory status: unassisted and spontaneous ventilation  Hydration status: euvolemic  Follow-up not needed.          Vitals Value Taken Time   /77 01/18/23 0922   Temp 37 °C (98.6 °F) 01/18/23 0922   Pulse 77 01/18/23 0922   Resp 18 01/18/23 0922   SpO2 100 % 01/18/23 0922         No case tracking events are documented in the log.      Pain/Sai Score: No data recorded

## 2023-01-18 NOTE — DISCHARGE SUMMARY
[facility] - Endoscopy  Discharge Note  Short Stay    Colonoscopy      OUTCOME: Patient tolerated treatment/procedure well without complication and is now ready for discharge.    DISPOSITION: Home or Self Care    FINAL DIAGNOSIS:  <principal problem not specified> encounter for screening colonoscopy    FOLLOWUP: With primary care provider    DISCHARGE INSTRUCTIONS:  No discharge procedures on file.     TIME SPENT ON DISCHARGE: 5 minutes

## 2023-01-18 NOTE — TRANSFER OF CARE
Anesthesia Transfer of Care Note    Patient: Mao Merritt    Procedure(s) Performed: * No procedures listed *    Patient location: GI    Anesthesia Type: general    Transport from OR: Transported from OR on room air with adequate spontaneous ventilation    Post pain: adequate analgesia    Post assessment: no apparent anesthetic complications    Post vital signs: stable    Level of consciousness: responds to stimulation    Nausea/Vomiting: no nausea/vomiting    Complications: none    Transfer of care protocol was followed      Last vitals:   Visit Vitals  /77 (BP Location: Right arm, Patient Position: Lying)   Pulse 77   Temp 37 °C (98.6 °F) (Oral)   Resp 18   Wt 63.5 kg (140 lb)   SpO2 100%   BMI 21.29 kg/m²

## 2023-01-18 NOTE — ANESTHESIA PREPROCEDURE EVALUATION
01/18/2023  Mao Merritt is a 72 y.o., male.      Pre-op Assessment    I have reviewed the Patient Summary Reports.     I have reviewed the Nursing Notes. I have reviewed the NPO Status.   I have reviewed the Medications.     Review of Systems  Cardiovascular:   Hypertension        Physical Exam  General: Well nourished, Cooperative, Alert and Oriented    Airway:  Mallampati: II   Mouth Opening: Normal  TM Distance: Normal  Tongue: Normal  Neck ROM: Normal ROM        Anesthesia Plan  Type of Anesthesia, risks & benefits discussed:    Anesthesia Type: Gen Natural Airway  Intra-op Monitoring Plan: Standard ASA Monitors  Post Op Pain Control Plan: multimodal analgesia  Induction:  IV  Airway Plan: , Awake  Informed Consent: Informed consent signed with the Patient and all parties understand the risks and agree with anesthesia plan.  All questions answered. Patient consented to blood products? Yes  ASA Score: 3  Day of Surgery Review of History & Physical: H&P Update referred to the surgeon/provider.    Ready For Surgery From Anesthesia Perspective.     .

## 2023-01-18 NOTE — ANESTHESIA RELEASE NOTE
Anesthesia Release from PACU Note    Patient: Mao Merritt    Procedure(s) Performed: * No procedures listed *    Anesthesia type: general    Post pain: Adequate analgesia    Post assessment: no apparent anesthetic complications    Last Vitals:   Visit Vitals  /77 (BP Location: Right arm, Patient Position: Lying)   Pulse 77   Temp 37 °C (98.6 °F) (Oral)   Resp 18   Wt 63.5 kg (140 lb)   SpO2 100%   BMI 21.29 kg/m²       Post vital signs: stable    Level of consciousness: awake    Nausea/Vomiting: no nausea/no vomiting    Complications: none    Airway Patency: patent    Respiratory: unassisted    Cardiovascular: stable and blood pressure at baseline    Hydration: euvolemic

## 2023-01-20 LAB
ESTROGEN SERPL-MCNC: NORMAL PG/ML
INSULIN SERPL-ACNC: NORMAL U[IU]/ML
LAB AP GROSS DESCRIPTION: NORMAL
LAB AP LABORATORY NOTES: NORMAL
T3RU NFR SERPL: NORMAL %

## 2023-04-13 ENCOUNTER — HOSPITAL ENCOUNTER (OUTPATIENT)
Dept: RADIOLOGY | Facility: HOSPITAL | Age: 73
Discharge: HOME | End: 2023-04-13
Attending: FAMILY MEDICINE
Payer: MEDICARE

## 2023-04-13 DIAGNOSIS — R56.9 SEIZURE-LIKE ACTIVITY: ICD-10-CM

## 2023-04-13 PROCEDURE — 70450 CT HEAD/BRAIN W/O DYE: CPT | Mod: TC

## 2024-02-13 ENCOUNTER — HOSPITAL ENCOUNTER (OUTPATIENT)
Dept: RADIOLOGY | Facility: HOSPITAL | Age: 74
Discharge: HOME | End: 2024-02-13
Attending: FAMILY MEDICINE
Payer: MEDICARE

## 2024-02-13 DIAGNOSIS — M54.50 LOW BACK PAIN: ICD-10-CM

## 2024-02-13 PROCEDURE — 72110 X-RAY EXAM L-2 SPINE 4/>VWS: CPT | Mod: TC

## 2024-02-28 ENCOUNTER — HOSPITAL ENCOUNTER (OUTPATIENT)
Dept: RADIOLOGY | Facility: HOSPITAL | Age: 74
Discharge: HOME | End: 2024-02-28
Attending: FAMILY MEDICINE
Payer: MEDICARE

## 2024-02-28 DIAGNOSIS — S22.080A T12 COMPRESSION FRACTURE: ICD-10-CM

## 2024-02-28 PROCEDURE — 72146 MRI CHEST SPINE W/O DYE: CPT | Mod: TC

## 2024-03-04 ENCOUNTER — HOSPITAL ENCOUNTER (EMERGENCY)
Facility: HOSPITAL | Age: 74
Discharge: LEFT BEFORE DISCHARGE | End: 2024-03-04
Payer: MEDICARE

## 2024-03-04 VITALS
RESPIRATION RATE: 15 BRPM | DIASTOLIC BLOOD PRESSURE: 90 MMHG | OXYGEN SATURATION: 100 % | SYSTOLIC BLOOD PRESSURE: 163 MMHG | HEART RATE: 84 BPM | TEMPERATURE: 98 F

## 2024-03-04 DIAGNOSIS — R07.9 CHEST PAIN, UNSPECIFIED TYPE: Primary | ICD-10-CM

## 2024-03-04 DIAGNOSIS — R06.02 SHORTNESS OF BREATH: ICD-10-CM

## 2024-03-04 LAB
ALBUMIN SERPL BCP-MCNC: 3 G/DL (ref 3.5–5)
ALBUMIN/GLOB SERPL: 0.7 {RATIO}
ALP SERPL-CCNC: 145 U/L (ref 45–115)
ALT SERPL W P-5'-P-CCNC: 12 U/L (ref 16–61)
ANION GAP SERPL CALCULATED.3IONS-SCNC: 9 MMOL/L (ref 7–16)
AST SERPL W P-5'-P-CCNC: 20 U/L (ref 15–37)
BASOPHILS # BLD AUTO: 0.04 K/UL (ref 0–0.2)
BASOPHILS NFR BLD AUTO: 0.4 % (ref 0–1)
BILIRUB SERPL-MCNC: 0.7 MG/DL (ref ?–1.2)
BUN SERPL-MCNC: 10 MG/DL (ref 7–18)
BUN/CREAT SERPL: 12 (ref 6–20)
CALCIUM SERPL-MCNC: 9.1 MG/DL (ref 8.5–10.1)
CHLORIDE SERPL-SCNC: 105 MMOL/L (ref 98–107)
CO2 SERPL-SCNC: 27 MMOL/L (ref 21–32)
CREAT SERPL-MCNC: 0.85 MG/DL (ref 0.7–1.3)
DIFFERENTIAL METHOD BLD: ABNORMAL
EGFR (NO RACE VARIABLE) (RUSH/TITUS): 92 ML/MIN/1.73M2
EOSINOPHIL # BLD AUTO: 0.04 K/UL (ref 0–0.5)
EOSINOPHIL NFR BLD AUTO: 0.4 % (ref 1–4)
ERYTHROCYTE [DISTWIDTH] IN BLOOD BY AUTOMATED COUNT: 21.2 % (ref 11.5–14.5)
GLOBULIN SER-MCNC: 4.5 G/DL (ref 2–4)
GLUCOSE SERPL-MCNC: 101 MG/DL (ref 74–106)
HCT VFR BLD AUTO: 41.1 % (ref 40–54)
HGB BLD-MCNC: 13.2 G/DL (ref 13.5–18)
IMM GRANULOCYTES # BLD AUTO: 0.06 K/UL (ref 0–0.04)
IMM GRANULOCYTES NFR BLD: 0.6 % (ref 0–0.4)
LIPASE SERPL-CCNC: 22 U/L (ref 16–77)
LYMPHOCYTES # BLD AUTO: 2.32 K/UL (ref 1–4.8)
LYMPHOCYTES NFR BLD AUTO: 23.9 % (ref 27–41)
MCH RBC QN AUTO: 24.4 PG (ref 27–31)
MCHC RBC AUTO-ENTMCNC: 32.1 G/DL (ref 32–36)
MCV RBC AUTO: 76.1 FL (ref 80–96)
MONOCYTES # BLD AUTO: 0.72 K/UL (ref 0–0.8)
MONOCYTES NFR BLD AUTO: 7.4 % (ref 2–6)
MPC BLD CALC-MCNC: 10.2 FL (ref 9.4–12.4)
NEUTROPHILS # BLD AUTO: 6.53 K/UL (ref 1.8–7.7)
NEUTROPHILS NFR BLD AUTO: 67.3 % (ref 53–65)
NRBC # BLD AUTO: 0 X10E3/UL
NRBC, AUTO (.00): 0 %
NT-PROBNP SERPL-MCNC: 283 PG/ML (ref 1–125)
PLATELET # BLD AUTO: 239 K/UL (ref 150–400)
POTASSIUM SERPL-SCNC: 3.4 MMOL/L (ref 3.5–5.1)
PROT SERPL-MCNC: 7.5 G/DL (ref 6.4–8.2)
RBC # BLD AUTO: 5.4 M/UL (ref 4.6–6.2)
SODIUM SERPL-SCNC: 138 MMOL/L (ref 136–145)
TROPONIN I SERPL DL<=0.01 NG/ML-MCNC: 128.1 PG/ML
TROPONIN I SERPL DL<=0.01 NG/ML-MCNC: 58.7 PG/ML
WBC # BLD AUTO: 9.71 K/UL (ref 4.5–11)

## 2024-03-04 PROCEDURE — 93005 ELECTROCARDIOGRAM TRACING: CPT

## 2024-03-04 PROCEDURE — 80053 COMPREHEN METABOLIC PANEL: CPT

## 2024-03-04 PROCEDURE — 99284 EMERGENCY DEPT VISIT MOD MDM: CPT | Mod: ,,, | Performed by: EMERGENCY MEDICINE

## 2024-03-04 PROCEDURE — 83880 ASSAY OF NATRIURETIC PEPTIDE: CPT

## 2024-03-04 PROCEDURE — 93010 ELECTROCARDIOGRAM REPORT: CPT | Mod: ,,, | Performed by: INTERNAL MEDICINE

## 2024-03-04 PROCEDURE — 83690 ASSAY OF LIPASE: CPT

## 2024-03-04 PROCEDURE — 84484 ASSAY OF TROPONIN QUANT: CPT

## 2024-03-04 PROCEDURE — 99285 EMERGENCY DEPT VISIT HI MDM: CPT | Mod: 25

## 2024-03-04 PROCEDURE — 85025 COMPLETE CBC W/AUTO DIFF WBC: CPT

## 2024-03-04 NOTE — ED TRIAGE NOTES
Presents to ED via EMS c/o chest pain. EMS reports patient was in afib RVR upon arrival. HR came down with EMS after vagal maneuver and chest pain resolved.

## 2024-03-04 NOTE — Clinical Note
Date: 3/4/2024  Patient: Mao Merritt  Admitted: 3/4/2024  2:02 PM  Attending Provider: No att. providers found    Mao Merritt or his authorized caregiver has made the decision for the patient to leave the emergency department against the advice of  the emergency department staff. He or his authorized caregiver has been informed and understands the inherent risks, including death.  He or his authorized caregiver has decided to accept the responsibility for this decision. Mao Merritt and all [facility] essary parties have been advised that he may return for further evaluation or treatment. Mao Merritt had current vital signs as follows:  BP (!) 163/90   Pulse (!) 59   Resp 12

## 2024-03-04 NOTE — LETTER
Patient: Mao Merritt  YOB: 1950  Date: 3/4/2024 Time: 7:17 PM  Location: [facility] Emergency Department    Leaving the Hospital Against Medical Advice    Chart #:[id number]    This will certify that I, the undersigned,    ______________________________________________________________________    A patient in the above named medical center, having requested discharge and removal from the medical [place] against the advice of my attending physician(s), hereby release [facility], its physicians, officers and employees, severally and individually, from any and all liability of any nature whatsoever for any injury or harm or complication of any kind that may result directly or indirectly, by reason of my terminating my stay as a patient at [facility] and my departure from [facility], and hereby waive any and all rights of action I may now have or later acquire as a result of my voluntary departure from [facility] and the termination of my stay as a patient therein.    This release is made with the full knowledge of the danger that may result from the action which I am taking.      Date:_______________________                         ___________________________                                                                                    Patient/Legal Representative    Witness:        ____________________________                          ___________________________  Nurse                                                                        Physician

## 2024-03-04 NOTE — ED PROVIDER NOTES
Encounter Date: 3/4/2024       History     Chief Complaint   Patient presents with    Chest Pain     Mr. Mao Merritt is a 73-year-old is a  male with a past medical history of HTN , BKA and PVD who presents to the [facility] ED for acute chest pain. Associated symptoms are nausea, and dyspnea. Denies any abdominal discomfort or HA. Patient was at orthopedic appointment earlier today and he was find at that time. Once went home and had a drink of whiskey began to experience chest pain described as dull sensation located substernal with radiates bilaterally; at rest. States taking NSAID without any relief. Per relative, en-route to ED had tachycardia and the EMT gave medication to alleviate the elevated heart rate.     States that this has not happened in the past. Patient cardiologist, Dr. Flores that he visit in 2022 without any complications. Patient was started on ASA 81 mg at that time, but has not taken it in while. At this time, ordered cbc, cmp, troponin, CXR, BNP, and Lipase.       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    HENT: Negative.     Respiratory:  Positive for shortness of breath.    Cardiovascular:  Positive for chest pain. Negative for palpitations and leg swelling.   Gastrointestinal:  Positive for nausea.   Musculoskeletal:  Positive for back pain.   Neurological: Negative.        Physical Exam     Initial Vitals    BP Pulse Resp Temp SpO2   03/04/24 1441 03/04/24 1439 03/04/24 1439 03/04/24 1831 03/04/24 1439   139/82 93 15 98.1 °F (36.7 °C) 100 %      MAP       --                Physical Exam    Nursing note and vitals reviewed.  Constitutional: He appears well-developed and well-nourished.   HENT:   Head: Normocephalic and atraumatic.   Neck:   Normal range of motion.  Cardiovascular:  Normal rate.     Exam reveals no gallop and no friction rub.       No murmur heard.  Pulmonary/Chest: Breath sounds normal.   Abdominal: Bowel sounds are normal.   Musculoskeletal:         General: Normal range of motion.      Cervical back: Normal range of motion.     Neurological: He is oriented to person, place, and time.   Psychiatric: He has a normal mood and affect.         Medical Screening Exam   See Full Note    ED Course   Procedures  Labs Reviewed   COMPREHENSIVE METABOLIC PANEL - Abnormal; Notable for the following components:       Result Value    Potassium 3.4 (*)     Albumin 3.0 (*)     Globulin 4.5 (*)     Alk Phos 145 (*)     ALT 12 (*)     All other components within normal limits   NT-PRO NATRIURETIC PEPTIDE - Abnormal; Notable for the following components:    ProBNP 283 (*)     All other components within normal limits   CBC WITH DIFFERENTIAL - Abnormal; Notable for the following components:    Hemoglobin 13.2 (*)     MCV 76.1 (*)     MCH 24.4 (*)     RDW 21.2 (*)     Neutrophils % 67.3 (*)     Lymphocytes % 23.9 (*)     Monocytes % 7.4 (*)     Eosinophils % 0.4 (*)     Immature Granulocytes % 0.6 (*)     Immature Granulocytes, Absolute 0.06 (*)     All other components within normal limits   TROPONIN I - Abnormal; Notable for the following components:    Troponin I High Sensitivity 128.1 (*)     All other components within normal limits   TROPONIN I - Normal   LIPASE - Normal   CBC W/ AUTO DIFFERENTIAL    Narrative:     The following orders were created for panel order CBC auto differential.  Procedure                                Abnormality         Status                     ---------                               -----------         ------                     CBC with Differential[[id number]]       Abnormal            Final result                 Please view results for these tests on the individual orders.        ECG Results              EKG 12-lead (Final result)        Collection Time Result Time QRS Duration OHS QTC Calculation    03/04/24 14:39:15 03/05/24 10:42:17 74 430                     Final result by Interface, Lab In [facility] (03/05/24 10:42:26)                   Narrative:    Test Reason : R06.02,    Vent. Rate : 092 BPM     Atrial Rate : 000 BPM     P-R Int : 200 ms          QRS Dur : 074 ms      QT Int : 374 ms       P-R-T Axes : 081 015 071 degrees     QTc Int : 430 ms    Sinus rhythm with PAC(s)  Borderline ECG    Confirmed by Matias KELLOGG, Samir ESPINAL ([id number]) on 3/5/2024 10:42:14 AM    Referred By: [email or web address]   SELF           Confirmed By:Samir Salcedo MD                                  Imaging Results              X-Ray Chest AP Portable (Final result)  Result time 03/04/24 14:42:46      Final result by Adrian Chavis II, MD (03/04/24 14:42:46)                   Impression:      No evidence of cardiopulmonary disease.      Electronically signed by: Adrian Chavis  Date:    03/04/2024  Time:    14:42               Narrative:    EXAMINATION:  XR CHEST AP PORTABLE    CLINICAL HISTORY:  CHF;    COMPARISON:  28 December 2020    TECHNIQUE:  XR CHEST AP PORTABLE    FINDINGS:  The heart and mediastinum are normal in size and configuration.  The pulmonary vascularity is normal in caliber.  No lung infiltrates, effusions, pneumothorax or other abnormality is demonstrated.                                       Medications - No data to display  Medical Decision Making  Mr. Mao Merritt is a 73-year-old is a  male with a past medical history of HTN , BKA and PVD who presents to the [facility] ED for  acute chest pain. Associated symptoms are nausea, and dyspnea. Denies any abdominal discomfort or HA.     DDX  1 Stable Angina  2.NSTEMI    MDM    Patient presents for emergent evaluation of acute chest pain  that poses a threat to life and/or bodily function.    In the ED patient found to have acute chest pain.    I ordered labs and personally reviewed them.  Labs significant for unremarkable  I ordered X-rays and personally reviewed them and reviewed the radiologist interpretation.  Xray  unremarkable.    I ordered EKG and personally reviewed it.  EKG significant for sinus rhythm with PAC's.        Discharge MDM  Patient was discharged in stable condition.  Detailed return precautions discussed.     Amount and/or Complexity of Data Reviewed  Labs: ordered.  Radiology: ordered.                                      Clinical Impression:   Final diagnoses:  [R06.02] Shortness of breath  [R07.9] Chest pain, unspecified type (Primary)        ED Disposition Condition    Magan Woods MD  04/08/24 0609

## 2024-03-05 ENCOUNTER — TELEPHONE (OUTPATIENT)
Dept: EMERGENCY MEDICINE | Facility: HOSPITAL | Age: 74
End: 2024-03-05
Payer: MEDICARE

## 2024-03-05 LAB
OHS QRS DURATION: 74 MS
OHS QTC CALCULATION: 430 MS

## 2024-03-05 NOTE — ED NOTES
Pt was addiment that they wanted to go home and not wait for the provider or anymore tests to be done for him tonight. Pt wife was also wanting to leave. I discussed the risks with the patient leaving ama and they wanted me to bring the paperwork so they could get out of here and go home. Stated son who works nights needs to get home and go to sleep and he is their ride. I told them that even though they are leaving ama not to worry if they need us at any point that we are here to help them.  I told the pt and wife if they have any concerns or anything they are welcome to contact us at any time. Pt stated that they would just follow up with their pcp tomorrow.

## 2024-04-10 NOTE — PROGRESS NOTES
Dr. Villagomez was not working on April 4.  Dr. Bartlett was on day shift and Dr. Boles was on Night shift, when he left AMA.  Please defer documentation to them.

## 2024-04-30 ENCOUNTER — HOSPITAL ENCOUNTER (OUTPATIENT)
Dept: RADIOLOGY | Facility: HOSPITAL | Age: 74
Discharge: HOME | End: 2024-04-30
Attending: FAMILY MEDICINE
Payer: MEDICARE

## 2024-04-30 DIAGNOSIS — R41.0 CONFUSED: ICD-10-CM

## 2024-04-30 DIAGNOSIS — R56.9 SEIZURE: ICD-10-CM

## 2024-04-30 DIAGNOSIS — R51.9 HEADACHE: ICD-10-CM

## 2024-04-30 PROCEDURE — 70450 CT HEAD/BRAIN W/O DYE: CPT | Mod: TC
